# Patient Record
Sex: MALE | Race: BLACK OR AFRICAN AMERICAN | NOT HISPANIC OR LATINO | Employment: UNEMPLOYED | URBAN - METROPOLITAN AREA
[De-identification: names, ages, dates, MRNs, and addresses within clinical notes are randomized per-mention and may not be internally consistent; named-entity substitution may affect disease eponyms.]

---

## 2017-07-26 ENCOUNTER — TELEPHONE (OUTPATIENT)
Dept: PEDIATRICS | Facility: CLINIC | Age: 22
End: 2017-07-26

## 2017-07-26 NOTE — TELEPHONE ENCOUNTER
Mother calling that patient has CP. States that recently he has been very thirsty and having urinary frequency with a medicine like odor to the urine. Wanting to know if he can wait a week to be seen.  Advised that it could be signs of a UTI or also signs of diabetes and that he should be seen today to be evaluated.  Sherie Laird RN

## 2017-08-01 ENCOUNTER — OFFICE VISIT (OUTPATIENT)
Dept: PEDIATRICS | Facility: CLINIC | Age: 22
End: 2017-08-01
Payer: MEDICAID

## 2017-08-01 VITALS
WEIGHT: 114 LBS | HEART RATE: 80 BPM | HEIGHT: 62 IN | SYSTOLIC BLOOD PRESSURE: 106 MMHG | DIASTOLIC BLOOD PRESSURE: 54 MMHG | TEMPERATURE: 97.8 F | OXYGEN SATURATION: 98 % | BODY MASS INDEX: 20.98 KG/M2

## 2017-08-01 DIAGNOSIS — E55.9 VITAMIN D DEFICIENCY: ICD-10-CM

## 2017-08-01 DIAGNOSIS — G80.9 CEREBRAL PALSY, UNSPECIFIED TYPE (H): ICD-10-CM

## 2017-08-01 DIAGNOSIS — R35.0 URINARY FREQUENCY: ICD-10-CM

## 2017-08-01 DIAGNOSIS — Z00.00 ROUTINE GENERAL MEDICAL EXAMINATION AT A HEALTH CARE FACILITY: Primary | ICD-10-CM

## 2017-08-01 DIAGNOSIS — R56.9 SEIZURES (H): ICD-10-CM

## 2017-08-01 LAB
ALBUMIN UR-MCNC: NEGATIVE MG/DL
ANION GAP SERPL CALCULATED.3IONS-SCNC: 3 MMOL/L (ref 3–14)
APPEARANCE UR: CLEAR
BILIRUB UR QL STRIP: NEGATIVE
BUN SERPL-MCNC: 10 MG/DL (ref 7–30)
CALCIUM SERPL-MCNC: 9.5 MG/DL (ref 8.5–10.1)
CHLORIDE SERPL-SCNC: 106 MMOL/L (ref 94–109)
CO2 SERPL-SCNC: 25 MMOL/L (ref 20–32)
COLOR UR AUTO: YELLOW
CREAT SERPL-MCNC: 0.7 MG/DL (ref 0.66–1.25)
GFR SERPL CREATININE-BSD FRML MDRD: >90 ML/MIN/1.7M2
GLUCOSE SERPL-MCNC: 98 MG/DL (ref 70–99)
GLUCOSE UR STRIP-MCNC: NEGATIVE MG/DL
HBA1C MFR BLD: 4.9 % (ref 4.3–6)
HGB UR QL STRIP: NEGATIVE
KETONES UR STRIP-MCNC: NEGATIVE MG/DL
LEUKOCYTE ESTERASE UR QL STRIP: NEGATIVE
NITRATE UR QL: NEGATIVE
PH UR STRIP: 7 PH (ref 5–7)
POTASSIUM SERPL-SCNC: 4.5 MMOL/L (ref 3.4–5.3)
SODIUM SERPL-SCNC: 134 MMOL/L (ref 133–144)
SP GR UR STRIP: 1.01 (ref 1–1.03)
URN SPEC COLLECT METH UR: NORMAL
UROBILINOGEN UR STRIP-ACNC: 0.2 EU/DL (ref 0.2–1)

## 2017-08-01 PROCEDURE — 81003 URINALYSIS AUTO W/O SCOPE: CPT | Performed by: INTERNAL MEDICINE

## 2017-08-01 PROCEDURE — 99385 PREV VISIT NEW AGE 18-39: CPT | Performed by: INTERNAL MEDICINE

## 2017-08-01 PROCEDURE — 99212 OFFICE O/P EST SF 10 MIN: CPT | Mod: 25 | Performed by: INTERNAL MEDICINE

## 2017-08-01 PROCEDURE — 36415 COLL VENOUS BLD VENIPUNCTURE: CPT | Performed by: INTERNAL MEDICINE

## 2017-08-01 PROCEDURE — 82306 VITAMIN D 25 HYDROXY: CPT | Performed by: INTERNAL MEDICINE

## 2017-08-01 PROCEDURE — 83036 HEMOGLOBIN GLYCOSYLATED A1C: CPT | Performed by: INTERNAL MEDICINE

## 2017-08-01 PROCEDURE — 80048 BASIC METABOLIC PNL TOTAL CA: CPT | Performed by: INTERNAL MEDICINE

## 2017-08-01 NOTE — NURSING NOTE
"Chief Complaint   Patient presents with     Physical       Initial /54 (BP Location: Right arm, Cuff Size: Adult Regular)  Pulse 80  Temp 97.8  F (36.6  C) (Tympanic)  Ht 5' 2\" (1.575 m)  Wt 114 lb (51.7 kg)  SpO2 98%  BMI 20.85 kg/m2 Estimated body mass index is 20.85 kg/(m^2) as calculated from the following:    Height as of this encounter: 5' 2\" (1.575 m).    Weight as of this encounter: 114 lb (51.7 kg).  Medication Reconciliation: complete   Shira Bruce MA    "

## 2017-08-01 NOTE — PATIENT INSTRUCTIONS
1) We can try to catch a urine on the hat    2) Labs downstairs today    3) Speech, occupational therapy, and physical therapy all at [place], give them a call in [place] in a couple days to arrange- we will get these faxed over    Michael Worthy MD    Preventive Health Recommendations  Male Ages 18 - 25     Yearly exam:             See your health care provider every year in order to  o   Review health changes.   o   Discuss preventive care.    o   Review your medicines if your doctor has prescribed any.    You should be tested each year for STDs (sexually transmitted diseases).     Talk to your provider about cholesterol testing.      If you are at risk for diabetes, you should have a diabetes test (fasting glucose).    Shots: Get a flu shot each year. Get a tetanus shot every 10 years.     Nutrition:    Eat at least 5 servings of fruits and vegetables daily.     Eat whole-grain bread, whole-wheat pasta and brown rice instead of white grains and rice.     Talk to your provider about calcium and Vitamin D.     Lifestyle    Exercise for at least 150 minutes a week (30 minutes a day, 5 days a week). This will help you control your weight and prevent disease.     Limit alcohol to one drink per day.     No smoking.     Wear sunscreen to prevent skin cancer.     See your dentist every six months for an exam and cleaning.     Preventive Health Recommendations  Male Ages 18 - 25     Yearly exam:             See your health care provider every year in order to  o   Review health changes.   o   Discuss preventive care.    o   Review your medicines if your doctor has prescribed any.    You should be tested each year for STDs (sexually transmitted diseases).     Talk to your provider about cholesterol testing.      If you are at risk for diabetes, you should have a diabetes test (fasting glucose).    Shots: Get a flu shot each year. Get a tetanus shot every 10 years.     Nutrition:    Eat at least 5 servings of fruits and  vegetables daily.     Eat whole-grain bread, whole-wheat pasta and brown rice instead of white grains and rice.     Talk to your provider about calcium and Vitamin D.     Lifestyle    Exercise for at least 150 minutes a week (30 minutes a day, 5 days a week). This will help you control your weight and prevent disease.     Limit alcohol to one drink per day.     No smoking.     Wear sunscreen to prevent skin cancer.     See your dentist every six months for an exam and cleaning.

## 2017-08-01 NOTE — PROGRESS NOTES
SUBJECTIVE:   CC: Mariann Magana is an 22 year old male who presents for preventative health visit.     Physical   Annual:     Getting at least 3 servings of Calcium per day::  Yes    Bi-annual eye exam::  NO    Dental care twice a year::  NO    Sleep apnea or symptoms of sleep apnea::  None    Diet::  Regular (no restrictions)    Frequency of exercise::  2-3 days/week    Duration of exercise::  Other    Taking medications regularly::  Yes    Medication side effects::  None    Additional concerns today::  No    Has been urinating more, when not says he is thirsty. Peeing every 4-10 minutes. Past pull up onto the floor area, seems more strong in character. Mediciny quality to it. [phone] in the past. Had difficulty to stand on weight today. No fevers. Grandmother with diabetes.    CP: had vaginal delivery, did get a blood clot after that. DD. Was full 7 pounds at that time, was in the NICU- had sensitivity to light. 9 months with severe CP, was in [place] at that time. [place] around age 4 would likely not walk. Trying to keep legs from locking up. Eating ok.   - lifts weights, able to  and move with upper body strength, has issues with stretching arms out.     Tried botox in the past, concern for causing opposite of desired effect.     Seizures in the past, has been off medications without further events.        Today's PHQ-2 Score:   PHQ-2 ( 1999 Pfizer) 8/1/2017   Q1: Little interest or pleasure in doing things 1   Q2: Feeling down, depressed or hopeless 0   PHQ-2 Score 1   Q1: Little interest or pleasure in doing things Several days   Q2: Feeling down, depressed or hopeless Not at all   PHQ-2 Score 1       Abuse: Current or Past(Physical, Sexual or Emotional)- NOT APPLICABLE  Do you feel safe in your environment - NOT APPLICABLE    Social History   Substance Use Topics     Smoking status: Never Smoker     Smokeless tobacco: Never Used     Alcohol use No     The patient does not drink >3 drinks per  "day nor >7 drinks per week.    Last PSA: No results found for: PSA    Reviewed orders with patient. Reviewed health maintenance and updated orders accordingly - Yes  Labs reviewed in EPIC    Reviewed and updated as needed this visit by clinical staff  Tobacco  Allergies  Meds  Med Hx  Surg Hx  Fam Hx  Soc Hx        Reviewed and updated as needed this visit by Provider              ROS:  C: NEGATIVE for fever, chills, change in weight  I: NEGATIVE for worrisome rashes, moles or lesions  E: NEGATIVE for vision changes or irritation  ENT: NEGATIVE for ear, mouth and throat problems  R: NEGATIVE for significant cough or SOB  CV: NEGATIVE for chest pain, palpitations or peripheral edema  GI: NEGATIVE for nausea, abdominal pain, heartburn, or change in bowel habits   male: negative for dysuria, hematuria, decreased urinary stream, erectile dysfunction, urethral discharge  M: NEGATIVE for significant arthralgias or myalgia  N: NEGATIVE for weakness, dizziness or paresthesias  P: NEGATIVE for changes in mood or affect    OBJECTIVE:   /54 (BP Location: Right arm, Cuff Size: Adult Regular)  Pulse 80  Temp 97.8  F (36.6  C) (Tympanic)  Ht 5' 2\" (1.575 m)  Wt 114 lb (51.7 kg)  SpO2 98%  BMI 20.85 kg/m2    EXAM:  GENERAL: sitting in WC in no acute distress  EYES: Eyes grossly normal to inspection, PERRL and conjunctivae and sclerae normal  HENT: ear canals and TM's normal, nose and mouth without ulcers or lesions  NECK: no adenopathy, no asymmetry, masses, or scars and thyroid normal to palpation  RESP: lungs clear to auscultation - no rales, rhonchi or wheezes  CV: regular rate and rhythm, normal S1 S2, no S3 or S4, no murmur, click or rub, no peripheral edema and peripheral pulses strong  ABDOMEN: soft, nontender, no hepatosplenomegaly, no masses and bowel sounds normal  MS: noted contractures of bilateral upper extremities and slightly contractured lower extremities, feet with wide spacing and lateral " "deviation of the feet.   SKIN: no suspicious lesions or rashes  NEURO: responsive to mom, minimally communicative.   PSYCH: pleasant and appropriate    ASSESSMENT/PLAN:   1. Routine general medical examination at a health care facility  Discussed routine health screening, vaccines UTD    2. Cerebral palsy, unspecified type (H)  Will get PT evaluation as mom is concerned about contractures, discussed with Peds PT in [place], they feel appropriate for patient evaluation. [facility] in [place] did not feel equipped, Speech evaluation for assistive technology at [facility]  - Basic metabolic panel  - Hemoglobin A1c  - *UA reflex to Microscopic and Culture ([facility] (except [place] and [place])  - SPEECH THERAPY REFERRAL  - PHYSICAL THERAPY REFERRAL  - Vitamin D Deficiency  - PHYSICAL THERAPY REFERRAL  - OFFICE/OUTPT VISIT,EST,LEVL II    3. Seizures (H)  Will continue to monitor, discussed signs and symptoms to watch for   - Basic metabolic panel  - Hemoglobin A1c  - *UA reflex to Microscopic and Culture ([facility] (except [place] and [place])  - SPEECH THERAPY REFERRAL  - OFFICE/OUTPT VISIT,EST,LEVL II    4. Urinary frequency  No UTI or diabetes signs today, will continue to monitor  - Basic metabolic panel  - Hemoglobin A1c  - *UA reflex to Microscopic and Culture ([facility] (except [place] and [place])  - SPEECH THERAPY REFERRAL  - OFFICE/OUTPT VISIT,EST,LEVL II    5. Vitamin D deficiency  - vitamin D (ERGOCALCIFEROL) 22469 UNIT capsule; Take 1 capsule (50,000 Units) by mouth every 7 days for 8 doses  Dispense: 8 capsule; Refill: 0  - OFFICE/OUTPT VISIT,EST,LEVL II    COUNSELING:   Reviewed preventive health counseling, as reflected in patient instructions         reports that he has never smoked. He has never used smokeless tobacco.      Estimated body mass index is 20.85 kg/(m^2) as calculated from the following:    Height as of this encounter: 5' 2\" " (1.575 m).    Weight as of this encounter: 114 lb (51.7 kg).         Counseling Resources:  ATP IV Guidelines  Pooled Cohorts Equation Calculator  FRAX Risk Assessment  ICSI Preventive Guidelines  Dietary Guidelines for Americans, 2010  USDA's MyPlate  ASA Prophylaxis  Lung CA Screening    Michael Worthy MD, MD  [facility]        Documentation of Face-to-Face and Certification for Home Health Services     Patient: Mariann Magana   YOB: 1995  MR Number: [id number]  Today's Date: 3/27/2018    I certify that patient: Mariann Magana is under my care and that I, or a nurse practitioner or physician's assistant working with me, had a face-to-face encounter that meets the physician face-to-face encounter requirements with this patient on: 8/1/2017.    This encounter with the patient was in whole, or in part, for the following medical condition, which is the primary reason for home health care: CP, seizures.    I certify that, based on my findings, the following services are medically necessary home health services: Occupational Therapy, Physical Therapy and Speech Language Therapy.    My clinical findings support the need for the above services because: Occupational Therapy Services are needed to assess and treat cognitive ability and address ADL safety due to impairment in contractures. and Physical Therapy Services are needed to assess and treat the following functional impairments: contractures/CP.    Further, I certify that my clinical findings support that this patient is homebound (i.e. absences from home require considerable and taxing effort and are for medical reasons or [employer] services or infrequently or of short duration when for other reasons) because: Requires assistance of another person or specialized equipment to access medical services because patient: Is unable to operate assistive equipment on their own...    Based on the above findings. I certify that this patient is  confined to the home and needs intermittent skilled nursing care, physical therapy and/or speech therapy.  The patient is under my care, and I have initiated the establishment of the plan of care.  This patient will be followed by a physician who will periodically review the plan of care.  Physician/Provider to provide follow up care: Michael Worthy    Responsible Medicare certified PECOS Physician: Michael Worthy MD  Physician Signature: See electronic signature associated with these discharge orders.  Date: 3/27/2018

## 2017-08-01 NOTE — MR AVS SNAPSHOT
After Visit Summary   8/1/2017    Mariann Magana    MRN: [id number]           Patient Information     Date Of Birth          1995        Visit Information        Provider Department      8/1/2017 3:10 PM Michael Worthy MD [facility]        Today's Diagnoses     Routine general medical examination at a health care facility    -  1    Cerebral palsy, unspecified type (H)        Seizures (H)        Urinary frequency          Care Instructions    1) We can try to catch a urine on the hat    2) Labs downstairs today    3) Speech, occupational therapy, and physical therapy all at [facility], give them a call in [place] in a couple days to arrange- we will get these faxed over    Michael Worthy MD    Preventive Health Recommendations  Male Ages 18 - 25     Yearly exam:             See your health care provider every year in order to  o   Review health changes.   o   Discuss preventive care.    o   Review your medicines if your doctor has prescribed any.    You should be tested each year for STDs (sexually transmitted diseases).     Talk to your provider about cholesterol testing.      If you are at risk for diabetes, you should have a diabetes test (fasting glucose).    Shots: Get a flu shot each year. Get a tetanus shot every 10 years.     Nutrition:    Eat at least 5 servings of fruits and vegetables daily.     Eat whole-grain bread, whole-wheat pasta and brown rice instead of white grains and rice.     Talk to your provider about calcium and Vitamin D.     Lifestyle    Exercise for at least 150 minutes a week (30 minutes a day, 5 days a week). This will help you control your weight and prevent disease.     Limit alcohol to one drink per day.     No smoking.     Wear sunscreen to prevent skin cancer.     See your dentist every six months for an exam and cleaning.     Preventive Health Recommendations  Male Ages 18 - 25     Yearly exam:             See your health care provider every year  in order to  o   Review health changes.   o   Discuss preventive care.    o   Review your medicines if your doctor has prescribed any.    You should be tested each year for STDs (sexually transmitted diseases).     Talk to your provider about cholesterol testing.      If you are at risk for diabetes, you should have a diabetes test (fasting glucose).    Shots: Get a flu shot each year. Get a tetanus shot every 10 years.     Nutrition:    Eat at least 5 servings of fruits and vegetables daily.     Eat whole-grain bread, whole-wheat pasta and brown rice instead of white grains and rice.     Talk to your provider about calcium and Vitamin D.     Lifestyle    Exercise for at least 150 minutes a week (30 minutes a day, 5 days a week). This will help you control your weight and prevent disease.     Limit alcohol to one drink per day.     No smoking.     Wear sunscreen to prevent skin cancer.     See your dentist every six months for an exam and cleaning.             Follow-ups after your visit        Additional Services     OCCUPATIONAL THERAPY REFERRAL       Occupational therapy evaluation at [facility]            PHYSICAL THERAPY REFERRAL       [facility]            SPEECH THERAPY REFERRAL       AAC evaluation and treatment order for speech at [facility] Fax [phone], phone [phone]                  Who to contact     If you have questions or need follow up information about today's clinic visit or your schedule please contact [facility] directly at [phone].  Normal or non-critical lab and imaging results will be communicated to you by MyChart, letter or phone within 4 business days after the clinic has received the results. If you do not hear from us within 7 days, please contact the clinic through MyChart or phone. If you have a critical or abnormal lab result, we will notify you by phone as soon as possible.  Submit refill requests through LookFlowt or call  "your pharmacy and they will forward the refill request to us. Please allow 3 business days for your refill to be completed.          Additional Information About Your Visit        MyChart Information     GetBulbhart lets you send messages to your doctor, view your test results, renew your prescriptions, schedule appointments and more. To sign up, go to [email or web address] . Click on \"Log in\" on the left side of the screen, which will take you to the Welcome page. Then click on \"Sign up Now\" on the right side of the page.     You will be asked to enter the access code listed below, as well as some personal information. Please follow the directions to create your username and password.     Your access code is: [email or web address]  Expires: 10/30/2017  4:02 PM     Your access code will  in 90 days. If you need help or a new code, please call your [facility] clinic or [phone].        Care EveryWhere ID     This is your [facility] EveryWhere ID. This could be used by other organizations to access your [facility] medical records  [id number]        Your Vitals Were     Pulse Temperature Height Pulse Oximetry BMI (Body Mass Index)       80 97.8  F (36.6  C) (Tympanic) 5' 2\" (1.575 m) 98% 20.85 kg/m2        Blood Pressure from Last 3 Encounters:   17 106/54    Weight from Last 3 Encounters:   17 114 lb (51.7 kg)              We Performed the Following     *UA reflex to Microscopic and Culture ([facility] (except [place] and [place])     Basic metabolic panel     Hemoglobin A1c     OCCUPATIONAL THERAPY REFERRAL     PHYSICAL THERAPY REFERRAL     SPEECH THERAPY REFERRAL     Vitamin D Deficiency        Primary Care Provider    None Specified       No primary provider on file.        Equal Access to Services     TYRON VILLEGAS : anil Arora, alan stevenson. So [facility] [phone].    ATENCIÓN: Si zamzam tenorio " cochran disposición servicios gratuitos de asistencia lingüística. Salma reinoso [phone].    We comply with applicable federal civil rights laws and [place] laws. We do not discriminate on the basis of race, color, national origin, age, disability sex, sexual orientation or gender identity.            Thank you!     Thank you for choosing [facility]  for your care. Our goal is always to provide you with excellent care. Hearing back from our patients is one way we can continue to improve our services. Please take a few minutes to complete the written survey that you may receive in the mail after your visit with us. Thank you!             Your Updated Medication List - Protect others around you: Learn how to safely use, store and throw away your medicines at www.disposemymeds.org.      Notice  As of 8/1/2017  4:02 PM    You have not been prescribed any medications.

## 2017-08-02 LAB — DEPRECATED CALCIDIOL+CALCIFEROL SERPL-MC: 18 UG/L (ref 20–75)

## 2017-08-02 RX ORDER — ERGOCALCIFEROL 1.25 MG/1
50000 CAPSULE, LIQUID FILLED ORAL
Qty: 8 CAPSULE | Refills: 0 | Status: SHIPPED | OUTPATIENT
Start: 2017-08-02 | End: 2017-09-21

## 2017-08-11 ENCOUNTER — HOSPITAL ENCOUNTER (OUTPATIENT)
Dept: PHYSICAL THERAPY | Facility: CLINIC | Age: 22
End: 2017-08-11
Attending: INTERNAL MEDICINE
Payer: MEDICAID

## 2017-08-11 DIAGNOSIS — G80.9 CEREBRAL PALSY, UNSPECIFIED TYPE (H): ICD-10-CM

## 2017-08-11 DIAGNOSIS — M24.562 CONTRACTURES INVOLVING BOTH KNEES: ICD-10-CM

## 2017-08-11 DIAGNOSIS — M62.81 GENERALIZED MUSCLE WEAKNESS: Primary | ICD-10-CM

## 2017-08-11 DIAGNOSIS — M24.561 CONTRACTURES INVOLVING BOTH KNEES: ICD-10-CM

## 2017-08-11 PROCEDURE — 40000188 ZZHC STATISTIC PT OP PEDS VISIT: Mod: GP | Performed by: PHYSICAL THERAPIST

## 2017-08-11 PROCEDURE — 97161 PT EVAL LOW COMPLEX 20 MIN: CPT | Mod: GP | Performed by: PHYSICAL THERAPIST

## 2017-08-11 NOTE — PROGRESS NOTES
" 17 1000   Visit Type   Visit Type Initial   General Information   Start of Care Date 17   Referring Physician Michael Worthy MD   Orders Evaluate and Treat as Indicated   Order Date 17   Medical Diagnosis Quadruplegic CP   Onset of illness/injury or Date of Surgery (gradual decline in function)   Precautions/Limitations (falls)   Pertinent history of current problem (include personal factors and/or comorbidities that impact the POC) Pt is 22y.o. male with quad CP. Per mom, he was seen at [facility] in January, where he was started on Baclofen 3x/day. Mom felt it was making him \"floppy,\" and therefore she selected to decrease/discontinue the dosage. She has not followed up with PM&R. Last botox injections were in , at which time mom noted a R foot drop that has not resolved. Mom is a PCA for another client 4hrs/day, but is otherwise home. Pt recieves 35hrs/wk of PCA services himself. He is with dad on the weekend. Additionally pt has an 18y.o. sister that lives with them, but she will be going off to college this fall.   Birth/Adoptive history Per mom, throughout her pregnancy she was told that she would require  delivery. But at time of birth, care team chose to move forward with a vaginal delivery with significant pushing. Per report, pt suffered a blood clot during the birthing process and subsequently spent 3wks in NICU.   Surgical/Medical history reviewed Yes   Current Community Support Family/friend caregiver;Personal care attendant   Lives With parent(s)   Number of Stairs Within Home (full flight with central landing)   Home/Community Accessibility Comments Two-level [facility], which is owned. Pt's bedroom is on the upper level. Kitchen and living space is on lower level.   Current Assistive Devices Manual wheelchair;Standing Frame  (folding travel w/c; solid-frame w/c with custom seating )   Patient/family goals (learn exercises to stay strong & prevent loss of " function)   General Information Comments Pt accompanied to initial evaluation by his mother, who is primary caretaker. Per report, mom has pt exercise to keep active as much as she knows how. Per report, pt lies on his stomach over exercise ball, climbs stairs, and lifts (no pattern) free weights 10-20lbs. He is independent with walking on his knees. He can get in/out of his w/c. He has a low-profile bench bench (ie. otter), which mom lowers his down to use. He sits on the toilet for BM, but otherwise diapers. He has standing frame, which mom reports using only 1-2x/mo 2/2 pt dislikes. Per report, pt previously used KAFO's, but currently he is not wearing any orthotics.   Self- Care   Dominant Hand left   Usual Activity Tolerance good   Current Activity Tolerance good   Regular Exercise yes  (see general information comments for details)   (R) Functional Level Prior   Age appropriate No   Ambulation 2-->assistive person   Transferring 2-->assistive person   Toileting 3-->assistive equipment and person   Bathing 4-->completely dependent   Dressing 4-->completely dependent   Cognition 2 - difficulty with organizing thoughts   Which of the above functional risks had a recent onset or change? none  (gradually requiring more assist)   Cognitive Status Examination   Follows Commands and Answers Questions unable to follow commands  (some reciprocal conversation)   Personal Safety and Judgment impaired   Memory impaired   Cognitive Comment developmental delay   Behavior   Behavior Comments pleasant and cooperative; giggly   Posture    Posture deficits were identified   Posture: Deficits Identified sacral sitting;poor head alignment;poor trunk alignment   Posture Comments Seated in w/c pt displays severe sacral sitting. Lying supine he displays a mild-moderate windswept position toward the L. Forward head position and rounding throughout spine.   Range of Motion (ROM)   Range of Motion  Range of Motion is limited   Upper  Extremity Range of Motion  Bilateral: ~80deg elbow flexion contractures. UE postured into pronation with ability to get no more than neutral supination. Shoulder abduction/flexion is functional, overhead.   Lower Extremity Range of Motion  Knee flexion contractures: 34deg R, 24deg L. Hip abduction: 7deg R, 12deg L. Plantarflexion contractures: 18deg R, 3deg L. *significant pes planus if not well stabilized at calcaneous   Strength   Manual Muscle Testing Results Strength deficits identified   Lower Extremity Strength  unable to sustain full weightbearing without falling deeper into crouch   Muscle Tone Assessment   Muscle Tone Comments UE/LE spasticity   Neurological Function   Neurological Function Comments heightened 2/2 CNS lesion related to medical diagnosis   Transfer Skills and Mobility   Bed Mobility Comments Pt moves sit to stand with 2 HHAssist, pulling up into stand. He requires assist with LE management to move from sit to supine, but he can return to sit independently but with increased effort. Per report, pt is able to get in/out of his manual w/c independently.   Gait   Gait Comments Pt ambulates with max arm-in-arm assist, typically mom's right arm with pt's left arm. He stands in a significant crouch position & displays poor independent balance. He does advance LE's independently, but better with anterior advancement vs. moving toward side or backwards in order to reposition. Per report, pt's primary means of mobility is walking on his knees. This is not observed at time of evaluation 2/2 time constraints.   Balance   Balance Comments Able to independently sit unsupported at edge of mat. Unable to stand independently, and poor dynamic balance with assisted gait.   General Therapy Interventions   Planned Therapy Interventions Therapeutic Procedures;Therapeutic Activities;Neuromuscular Re-education;Gait Training   Intervention Comments stretching; standing function; parent education & home programming;  referral for orthotics; address any equipment needs   Clinical Impression   Criteria for Skilled Therapeutic Interventions Met yes;treatment indicated   PT Diagnosis muscle weakness; contractures at knees   Influenced by the following impairments upper/lower extremity spasticity; multiple joint contractures & ROM deficits; decreased functional strength; balance deficits   Functional limitations due to impairments decreased standing function needed for transfers & household mobility; decreased knowledge of appropriate activities to improve &/or preserve function   Clinical Presentation Stable/Uncomplicated   Clinical Decision Making (Complexity) Low complexity   Therapy Frequency 1 time/week   Predicted Duration of Therapy Intervention (days/wks) initiate weekly x8wks then reduce to monthly to manage/update HEP as needed   Risk & Benefits of therapy have been explained Yes   Patient, Family & other staff in agreement with plan of care Yes   Clinical Impression Comments Pt clinically stable but has not had adequate physical therapy services in recent years, and therefore primary caretaker with decreased knowledge of appropriate activities/exercises to improve or preserve function. Pt's standing function has therefore gradual declined. Pt will require skilled PT services in order to provide instruction and education regarding an appropriate HEP, including stretching, strengthening, and a standing program. He will likely benefit from custom AFO's to help improve standing function. And physical therapy can help to facilitate any other equipment needs. Anticipate a burst of therapy to establish a home routine then regular follow up for updates and to address equipment needs.   Education Assessment   Preferred Learning Style Listening;Demonstration;Pictures/video   Barriers to Learning (mutliple caregivers (mom, dad, PCA))   Pediatric Goals   PT Pediatric Goals 1;2;3;4   Goal 1   Goal Identifier LE alignment   Goal  Description Pt will wear supportive orthotics throughout full school-day in order to better control LE alignment with transfers.   Target Date 11/08/17   Goal 2   Goal Identifier Standing program   Goal Description Pt will tolerate 30min/day of standing in standing frame to improve spasticity, ROM, and overall standing function.   Target Date 12/08/17   Goal 3   Goal Identifier Upright mobility   Goal Description Pt will ambulate 50ft with min-mod arm-in-arm assistance to reduce burden of care with household mobility.   Target Date 01/08/17   Goal 4   Goal Identifier HEP   Goal Description Pt and caregiver will be independent in a medically appropriate HEP, including stretching & strengthening, to maximize clinical gains.   Target Date (ongoing)   Total Evaluation Time   Total Evaluation Time 45     Thank you for referring Mariann to outpatient physical therapy at [facility]. If you have any questions or concerns regarding this report, please feel free to contact me at [phone] or [email or web address].    Susanna Kline, PT  Peds Physical Therapist

## 2017-08-11 NOTE — PROGRESS NOTES
[facility]      OUTPATIENT PEDIATRIC PHYSICAL THERAPY EVALUATION  PLAN OF TREATMENT FOR OUTPATIENT REHABILITATION  (COMPLETE FOR INITIAL CLAIMS ONLY)  Patient's Last Name, First Name, M.I.  YOB: 1995  Mariann Magana        Provider's Name   [facility]   Medical Record No.  [id number]     Start of Care Date:  08/11/17   Onset Date:   (gradual decline in function)   Type:     _X__PT   ____OT  ____SLP Medical Diagnosis:   Quadriplegic CP     PT Diagnosis:  muscle weakness; contractures at knees Visits from SOC:  1                              __________________________________________________________________________________  Plan of Treatment/Functional Goals:  Therapeutic Procedures, Therapeutic Activities, Neuromuscular Re-education, Gait Training  stretching; standing function; parent education & home programming; referral for orthotics; address any equipment needs           1. Goal Identifier: LE alignment  Goal Description: Pt will wear supportive orthotics throughout full school-day in order to better control LE alignment with transfers.  Target Date: 11/08/17    2. Goal Identifier: Standing program  Goal Description: Pt will tolerate 30min/day of standing in standing frame to improve spasticity, ROM, and overall standing function.  Target Date: 12/08/17    3. Goal Identifier: Upright mobility  Goal Description: Pt will ambulate 50ft with min-mod arm-in-arm assistance to reduce burden of care with household mobility.  Target Date: 01/08/17    4. Goal Identifier: HEP  Goal Description: Pt and caregiver will be independent in a medically appropriate HEP, including stretching & strengthening, to maximize clinical gains.  Target Date:  (ongoing)       Therapy Frequency:  1 time/week   Predicted Duration of Therapy Intervention:  initiate weekly x8wks then reduce to monthly  to manage/update HEP as needed    Susanna Kline, PT                                    I CERTIFY THE NEED FOR THESE SERVICES FURNISHED UNDER        THIS PLAN OF TREATMENT AND WHILE UNDER MY CARE     (Physician co-signature of this document indicates review and certification of the therapy plan).                  Certification Date From:    8/11/2017  Certification Date To:   11/8/2017  Referring Provider:  Michael Worthy MD    Initial Assessment  See Epic Evaluation- 08/11/17

## 2017-11-10 NOTE — ADDENDUM NOTE
Encounter addended by: Susanna Kline, PT on: 11/10/2017  2:11 PM<BR>     Actions taken: Pend clinical note, Sign clinical note, Episode resolved

## 2018-01-21 ENCOUNTER — HEALTH MAINTENANCE LETTER (OUTPATIENT)
Age: 23
End: 2018-01-21

## 2018-08-20 ENCOUNTER — TELEPHONE (OUTPATIENT)
Dept: PEDIATRICS | Facility: CLINIC | Age: 23
End: 2018-08-20

## 2018-08-20 NOTE — TELEPHONE ENCOUNTER
Reason for Call:  Other speech generating device    Detailed comments: [employer] is calling and they are the DME provider for the pt. They have new Medicare quidelines and they have been informed that the pt must now a 6 month visit for the new Speech Generating device. They need to make the appt. The company has called them to make an appt and the family has not called to make one. They would like help in getting the appt for the pt.  [phone] direct line to Gertrudis with the DME provider.    Phone Number Patient can be reached at: Home number on file [phone] (home)    Best Time: any    Can we leave a detailed message on this number? YES    Call taken on 8/20/2018 at 10:42 AM by Sapna Briones

## 2018-08-20 NOTE — TELEPHONE ENCOUNTER
Called Gertrudis-no answer.  Left a message for her to call the clinic.  Will await her return phone call.    Angella Felix RN

## 2018-08-22 NOTE — TELEPHONE ENCOUNTER
LOV with  was on 8/1/17. Not sure whether is going to a different clinic for care. Called pt at [phone]. Mom states that pt is non-verbal, he is not getting any speech generating device from anyone. No one has contacted them regarding his PT in the last one year.    Helped to schedule an appointment for routine px on 9/12.    Andres, RN  Triage Nurse

## 2018-09-12 ENCOUNTER — OFFICE VISIT (OUTPATIENT)
Dept: PEDIATRICS | Facility: CLINIC | Age: 23
End: 2018-09-12
Payer: MEDICAID

## 2018-09-12 VITALS
TEMPERATURE: 99 F | DIASTOLIC BLOOD PRESSURE: 62 MMHG | WEIGHT: 109 LBS | BODY MASS INDEX: 19.94 KG/M2 | SYSTOLIC BLOOD PRESSURE: 114 MMHG | OXYGEN SATURATION: 97 % | HEART RATE: 79 BPM

## 2018-09-12 DIAGNOSIS — Z00.00 ROUTINE GENERAL MEDICAL EXAMINATION AT A HEALTH CARE FACILITY: Primary | ICD-10-CM

## 2018-09-12 DIAGNOSIS — E55.9 VITAMIN D DEFICIENCY: ICD-10-CM

## 2018-09-12 DIAGNOSIS — R56.9 SEIZURES (H): ICD-10-CM

## 2018-09-12 DIAGNOSIS — G80.9 CEREBRAL PALSY, UNSPECIFIED TYPE (H): ICD-10-CM

## 2018-09-12 DIAGNOSIS — F80.9 SPEECH DELAY: ICD-10-CM

## 2018-09-12 DIAGNOSIS — Z23 NEED FOR PROPHYLACTIC VACCINATION AND INOCULATION AGAINST INFLUENZA: ICD-10-CM

## 2018-09-12 DIAGNOSIS — Z99.3 WHEELCHAIR BOUND: ICD-10-CM

## 2018-09-12 PROCEDURE — 99395 PREV VISIT EST AGE 18-39: CPT | Mod: 25 | Performed by: INTERNAL MEDICINE

## 2018-09-12 PROCEDURE — 90686 IIV4 VACC NO PRSV 0.5 ML IM: CPT | Performed by: INTERNAL MEDICINE

## 2018-09-12 PROCEDURE — 90471 IMMUNIZATION ADMIN: CPT | Performed by: INTERNAL MEDICINE

## 2018-09-12 ASSESSMENT — ENCOUNTER SYMPTOMS
SHORTNESS OF BREATH: 0
PALPITATIONS: 0
CHILLS: 0
ABDOMINAL PAIN: 0
CONSTIPATION: 0
ARTHRALGIAS: 0
DIARRHEA: 0
HEADACHES: 0
HEMATOCHEZIA: 0
MYALGIAS: 0
NAUSEA: 0
PARESTHESIAS: 0
EYE PAIN: 0
HEARTBURN: 0
DYSURIA: 0
NERVOUS/ANXIOUS: 0
FREQUENCY: 0
JOINT SWELLING: 0
HEMATURIA: 0
DIZZINESS: 0
WEAKNESS: 0
SORE THROAT: 0
COUGH: 0
FEVER: 0

## 2018-09-12 NOTE — PATIENT INSTRUCTIONS
1) Vitamin D lab drawn when able    2) Flu shot today    3) Call to set up with physical medicine and rehabilitation at [facility]  Preventive Health Recommendations  Male Ages 21 - 25     Yearly exam:             See your health care provider every year in order to  o   Review health changes.   o   Discuss preventive care.    o   Review your medicines if your doctor has prescribed any.    You should be tested each year for STDs (sexually transmitted diseases).     Talk to your provider about cholesterol testing.      If you are at risk for diabetes, you should have a diabetes test (fasting glucose).    Shots: Get a flu shot each year. Get a tetanus shot every 10 years.     Nutrition:    Eat at least 5 servings of fruits and vegetables daily.     Eat whole-grain bread, whole-wheat pasta and brown rice instead of white grains and rice.     Get adequate calcium and Vitamin D.     Lifestyle    Exercise for at least 150 minutes a week (30 minutes a day, 5 days a week). This will help you control your weight and prevent disease.     Limit alcohol to one drink per day.     No smoking.     Wear sunscreen to prevent skin cancer.     See your dentist every six months for an exam and cleaning.

## 2018-09-12 NOTE — MR AVS SNAPSHOT
After Visit Summary   9/12/2018    Mariann Magana    MRN: [id number]           Patient Information     Date Of Birth          1995        Visit Information        Provider Department      9/12/2018 9:50 AM Michael Worthy MD [facility]        Today's Diagnoses     Routine general medical examination at a health care facility    -  1    Need for prophylactic vaccination and inoculation against influenza        Seizures (H)        Cerebral palsy, unspecified type (H)        Wheelchair bound          Care Instructions    1) Vitamin D lab drawn when able    2) Flu shot today    3) Call to set up with physical medicine and rehabilitation at [facility]  Preventive Health Recommendations  Male Ages 21 - 25     Yearly exam:             See your health care provider every year in order to  o   Review health changes.   o   Discuss preventive care.    o   Review your medicines if your doctor has prescribed any.    You should be tested each year for STDs (sexually transmitted diseases).     Talk to your provider about cholesterol testing.      If you are at risk for diabetes, you should have a diabetes test (fasting glucose).    Shots: Get a flu shot each year. Get a tetanus shot every 10 years.     Nutrition:    Eat at least 5 servings of fruits and vegetables daily.     Eat whole-grain bread, whole-wheat pasta and brown rice instead of white grains and rice.     Get adequate calcium and Vitamin D.     Lifestyle    Exercise for at least 150 minutes a week (30 minutes a day, 5 days a week). This will help you control your weight and prevent disease.     Limit alcohol to one drink per day.     No smoking.     Wear sunscreen to prevent skin cancer.     See your dentist every six months for an exam and cleaning.             Follow-ups after your visit        Additional Services     PHYSIATRY REFERRAL       Your provider has referred you to: [facility]  [phone] or [phone] (toll-free)      Please note these locations do not prescribe narcotics or manage chronic pain.    Please be aware that coverage of these services is subject to the terms and limitations of your health insurance plan.  Call member services at your health plan with any benefit or coverage questions.      Please bring the following to your appointment:  >>   Any x-rays, CTs or MRIs which have been performed.  Contact the facility where they were done to arrange for  prior to your scheduled appointment.    >>   List of current medications   >>   This referral request   >>   Any documents/labs given to you for this referral                  Follow-up notes from your care team     Return in about 1 year (around 9/12/2019).      Future tests that were ordered for you today     Open Future Orders        Priority Expected Expires Ordered    Vitamin D Deficiency Routine  9/12/2019 9/12/2018            Who to contact     If you have questions or need follow up information about today's clinic visit or your schedule please contact [facility] directly at [phone].  Normal or non-critical lab and imaging results will be communicated to you by [employer], letter or phone within 4 business days after the clinic has received the results. If you do not hear from us within 7 days, please contact the clinic through [employer] or phone. If you have a critical or abnormal lab result, we will notify you by phone as soon as possible.  Submit refill requests through [employer] or call your pharmacy and they will forward the refill request to us. Please allow 3 business days for your refill to be completed.          Additional Information About Your Visit        [employer] Information     [employer] gives you secure access to your electronic health record. If you see a primary care provider, you can also send messages to your care team and make appointments. If you have questions, please call your  primary care clinic.  If you do not have a primary care provider, please call [phone] and they will assist you.        Care EveryWhere ID     This is your Care EveryWhere ID. This could be used by other organizations to access your [facility] medical records  [id number]        Your Vitals Were     Pulse Temperature Pulse Oximetry BMI (Body Mass Index)          79 99  F (37.2  C) (Oral) 97% 19.94 kg/m2         Blood Pressure from Last 3 Encounters:   09/12/18 114/62   08/01/17 106/54    Weight from Last 3 Encounters:   09/12/18 109 lb (49.4 kg)   08/01/17 114 lb (51.7 kg)              We Performed the Following     FLU VACCINE, SPLIT VIRUS, IM (QUADRIVALENT) [80351]- >3 YRS     PHYSIATRY REFERRAL        Primary Care Provider Office Phone # Fax #    Michael Worthy [phone] [address] [place] [address]        Equal Access to Services     [facility]: Hadii aad ku hadasho Soomaali, waaxda luqadaha, qaybta kaalmada adeegyada, waxay idiin hayaan naveedeg rashmi fox . So [facility] [phone].    ATENCIÓN: Si habla español, tiene a cochran disposición servicios gratuitos de asistencia lingüística. Llame al [phone].    We comply with applicable federal civil rights laws and [place] laws. We do not discriminate on the basis of race, color, national origin, age, disability, sex, sexual orientation, or gender identity.            Thank you!     Thank you for choosing [facility]  for your care. Our goal is always to provide you with excellent care. Hearing back from our patients is one way we can continue to improve our services. Please take a few minutes to complete the written survey that you may receive in the mail after your visit with us. Thank you!             Your Updated Medication List - Protect others around you: Learn how to safely use, store and throw away your medicines at www.disposemymeds.org.      Notice  As of 9/12/2018 10:40 AM    You have not  been prescribed any medications.

## 2018-09-12 NOTE — PROGRESS NOTES
SUBJECTIVE:   CC: Mariann Magana is an 23 year old male who presents for preventative health visit.     Physical   Annual:     Getting at least 3 servings of Calcium per day:  Yes    Bi-annual eye exam:  NO    Dental care twice a year:  NO    Sleep apnea or symptoms of sleep apnea:  None    Diet:  Regular (no restrictions)    Frequency of exercise:  2-3 days/week    Duration of exercise:  15-30 minutes    Taking medications regularly:  Yes    Medication side effects:  None    Additional concerns today:  No    CP: Did baclofen in the past with drowsiness and did three times day in the past and then twice per day then stopped due to concerns with drowsiness. Did try botox in the past and felt that did not help. Has been doing speech therapy. Pending getting speech assistance device.    Seizures in the remote past, has been off medications without other events.    Today's PHQ-2 Score:   PHQ-2 ( 1999 Pfizer) 8/1/2017   Q1: Little interest or pleasure in doing things 1   Q2: Feeling down, depressed or hopeless 0   PHQ-2 Score 1   Q1: Little interest or pleasure in doing things Several days   Q2: Feeling down, depressed or hopeless Not at all   PHQ-2 Score 1       Abuse: Current or Past(Physical, Sexual or Emotional)- No  Do you feel safe in your environment - Yes    Social History   Substance Use Topics     Smoking status: Never Smoker     Smokeless tobacco: Never Used     Alcohol use No     Alcohol Use 9/12/2018   If you drink alcohol do you typically have greater than 3 drinks per day OR greater than 7 drinks per week? No       Last PSA: No results found for: PSA    Reviewed orders with patient. Reviewed health maintenance and updated orders accordingly - Yes  Labs reviewed in EPIC    Reviewed and updated as needed this visit by clinical staff         Reviewed and updated as needed this visit by Provider            Review of Systems   Constitutional: Negative for chills and fever.   HENT: Negative for congestion, ear  pain, hearing loss and sore throat.    Eyes: Negative for pain and visual disturbance.   Respiratory: Negative for cough and shortness of breath.    Cardiovascular: Negative for chest pain and palpitations.   Gastrointestinal: Negative for abdominal pain, constipation, diarrhea, heartburn, hematochezia and nausea.   Genitourinary: Negative for discharge, dysuria, frequency, genital sores, hematuria, impotence and urgency.   Musculoskeletal: Negative for arthralgias, joint swelling and myalgias.   Skin: Negative for rash.   Neurological: Negative for dizziness, weakness, headaches and paresthesias.   Psychiatric/Behavioral: Negative for mood changes. The patient is not nervous/anxious.          OBJECTIVE:   /62 (BP Location: Right arm, Cuff Size: Adult Regular)  Pulse 79  Temp 99  F (37.2  C) (Oral)  Wt 109 lb (49.4 kg)  SpO2 97%  BMI 19.94 kg/m2    Physical Exam  GENERAL: sitting in WC in no acute distress  EYES: Eyes grossly normal to inspection, PERRL and conjunctivae and sclerae normal  HENT: ear canals and TM's normal, nose and mouth without ulcers or lesions  NECK: no adenopathy, no asymmetry, masses, or scars and thyroid normal to palpation  RESP: lungs clear to auscultation - no rales, rhonchi or wheezes  CV: regular rate and rhythm, normal S1 S2, no S3 or S4, no murmur, click or rub, no peripheral edema and peripheral pulses strong  ABDOMEN: soft, nontender, no hepatosplenomegaly, no masses and bowel sounds normal  MS: noted contractures of bilateral upper extremities and slightly contractured lower extremities, feet with wide spacing and lateral deviation of the feet.   SKIN: no suspicious lesions or rashes  NEURO: responsive to mom, minimally communicative.   PSYCH: pleasant and appropriate    Diagnostic Test Results:  none     ASSESSMENT/PLAN:       ICD-10-CM    1. Routine general medical examination at a health care facility Z00.00 Vitamin D Deficiency   2. Need for prophylactic vaccination  "and inoculation against influenza Z23 FLU VACCINE, SPLIT VIRUS, IM (QUADRIVALENT) [44821]- >3 YRS   3. Seizures (H) R56.9 PHYSIATRY REFERRAL   4. Cerebral palsy, unspecified type (H) G80.9 PHYSIATRY REFERRAL     order for DME   5. Wheelchair bound Z99.3    6. Vitamin D deficiency E55.9 cholecalciferol (VITAMIN D/ D-VI-SOL) 400 UNIT/ML LIQD liquid   7. Speech delay F80.9 order for DME       COUNSELING:   Reviewed preventive health counseling, as reflected in patient instructions    BP Readings from Last 1 Encounters:   08/01/17 106/54     Estimated body mass index is 20.85 kg/(m^2) as calculated from the following:    Height as of 8/1/17: 5' 2\" (1.575 m).    Weight as of 8/1/17: 114 lb (51.7 kg).           reports that he has never smoked. He has never used smokeless tobacco.      Counseling Resources:  ATP IV Guidelines  Pooled Cohorts Equation Calculator  FRAX Risk Assessment  ICSI Preventive Guidelines  Dietary Guidelines for Americans, 2010  USDA's MyPlate  ASA Prophylaxis  Lung CA Screening    Michael Worthy MD, MD  [facility]    Injectable Influenza Immunization Documentation    1.  Is the person to be vaccinated sick today?   No    2. Does the person to be vaccinated have an allergy to a component   of the vaccine?   No  Egg Allergy Algorithm Link    3. Has the person to be vaccinated ever had a serious reaction   to influenza vaccine in the past?   No    4. Has the person to be vaccinated ever had Guillain-Barré syndrome?   No    Form completed by Luli Menon MA           "

## 2018-09-13 NOTE — TELEPHONE ENCOUNTER
Attempted to call Gertrudis. No answer and did not leave message as VM may not be secure. Please try again.  Nena George LPN

## 2019-07-25 ENCOUNTER — OFFICE VISIT (OUTPATIENT)
Dept: PEDIATRICS | Facility: CLINIC | Age: 24
End: 2019-07-25
Payer: MEDICAID

## 2019-07-25 VITALS
HEART RATE: 94 BPM | SYSTOLIC BLOOD PRESSURE: 128 MMHG | DIASTOLIC BLOOD PRESSURE: 82 MMHG | TEMPERATURE: 98.5 F | OXYGEN SATURATION: 97 %

## 2019-07-25 DIAGNOSIS — L81.9 HYPERPIGMENTATION: Primary | ICD-10-CM

## 2019-07-25 DIAGNOSIS — Z00.00 HEALTH CARE MAINTENANCE: ICD-10-CM

## 2019-07-25 PROCEDURE — 90715 TDAP VACCINE 7 YRS/> IM: CPT | Performed by: NURSE PRACTITIONER

## 2019-07-25 PROCEDURE — 90471 IMMUNIZATION ADMIN: CPT | Performed by: NURSE PRACTITIONER

## 2019-07-25 PROCEDURE — 99213 OFFICE O/P EST LOW 20 MIN: CPT | Mod: 25 | Performed by: NURSE PRACTITIONER

## 2019-07-25 RX ORDER — KETOCONAZOLE 20 MG/ML
SHAMPOO TOPICAL
Qty: 120 ML | Refills: 0 | Status: SHIPPED | OUTPATIENT
Start: 2019-07-25 | End: 2019-09-03

## 2019-07-25 NOTE — PROGRESS NOTES
Subjective     Mariann Magana is a 24 year old male who presents to clinic today for the following health issues:    HPI   Rash  Dark circles appearing all over body      Duration: Appeared two months ago.     Description  Location: Legs, Chest, arms   Itching: Unsure    Intensity:      Accompanying signs and symptoms: None    History (similar episodes/previous evaluation): None    Precipitating or alleviating factors:  New exposures:  None and lotions  Recent travel: no    Therapies tried and outcome: none'    Not necessarily itchy but mom states he wouldn't scratch even if it were itchy. Doesn't scratch mosquito bites. No fever, etc.     ROS: const/derm otherwise negative     OBJECTIVE:  /82 (BP Location: Right arm, Patient Position: Chair, Cuff Size: Adult Regular)   Pulse 94   Temp 98.5  F (36.9  C) (Oral)   SpO2 97%   CONSTITUTIONAL: Alert, well-nourished, well-groomed, NAD  RESP: Lungs CTA. No wheeze, rhonchi, rales.  CV: HRRR S1 S2 No MRG. No peripheral edema  DERM: Several hyperpigmented macules over legs, chest and back. Not raised.     ASSESSMENT/PLAN:  (L81.9) Hyperpigmentation  (primary encounter diagnosis)  Comment: Possible tinea versicolor.   Plan: ketoconazole (NIZORAL) 2 % external shampoo,         DERMATOLOGY REFERRAL      -Advised that the hyperpigmentation may take months to go away  -Schedule f/u with derm to verify diagnosis. Mom wants general skin check as well    (Z00.00) Health care maintenance  Plan: TDAP VACCINE (ADACEL),      ADMIN VACCINE,         FIRST                Gregoria Cecilio, NANETTE-AGUSTO.

## 2019-07-25 NOTE — PATIENT INSTRUCTIONS
Patient Education     Tinea Versicolor  This is a rash caused by a fungus in the top layers of the skin. This fungus is normally present in the pores of the skin and causes no symptoms. But when the fungus overgrows it causes a rash. The fungus grows more easily in hot climates, and on oily or sweaty skin. Health experts don t know why some people get this rash and others don t. Experts also don t know why the rash will suddenly appear in someone who has never had it before.  The rash is made up of irregular pale or tan spots and patches. The rash is usually on the neck, upper back, chest, and shoulders. You may have mild itching, especially if you become overheated. But it doesn't cause other symptoms. Because these spots don't change color with sun exposure like normal skin, the rash may be lighter or darker than your normal skin.  This rash is harmless and usually causes no symptoms. The only reason for treatment is to improve appearance. Follow the advice below to clear the rash. It might take several months for normal skin color to return.  Home care    Use a medicated dandruff shampoo over your whole body while in the shower. Don t use soap. Let the shampoo stay on for at least a few minutes before rinsing off. Do this every day for 4 weeks.    As a different treatment, you may buy an antifungal cream (miconazole or clotrimazole, both available without a prescription). Use this 2 times a day for 7 days.     This rash is not contagious to others. It can t be spread if someone touches it. So you don t have to worry about exposing others at school, , or work.  Prevention  This fungus can come back again (recur) after treatment. To prevent return of the rash, use medicated dandruff shampoo over your whole body when in the shower. Do this once a month for the next year. This is very important to do in the summertime. That is when the rash is most likely to recur.  Other prevention tips include:    Avoid  oily skin products    Wear loose clothing. Try to let your skin stay cool and breathe.    Use sunscreen and protect yourself from sunlight    Avoid tanning beds  Follow-up care  Follow up with your healthcare provider, or as advised. Call your provider if the rash doesn t get better with the above treatment, or if new symptoms appear.  When to seek medical advice  Call your healthcare provider right away if any of these occur:    Increasing redness of the rash    Change in appearance of the rash    Fever of 100.4 F (38 C) or higher, or as directed by your provider  Date Last Reviewed: 8/1/2016 2000-2018 [employer]. [address] [place] [address], [place] [address]. All rights reserved. This information is not intended as a substitute for professional medical care. Always follow your healthcare professional's instructions.

## 2019-09-03 ENCOUNTER — OFFICE VISIT (OUTPATIENT)
Dept: PEDIATRICS | Facility: CLINIC | Age: 24
End: 2019-09-03
Payer: MEDICAID

## 2019-09-03 VITALS
BODY MASS INDEX: 20.19 KG/M2 | SYSTOLIC BLOOD PRESSURE: 124 MMHG | HEART RATE: 122 BPM | TEMPERATURE: 98.5 F | WEIGHT: 110.4 LBS | OXYGEN SATURATION: 97 % | DIASTOLIC BLOOD PRESSURE: 66 MMHG

## 2019-09-03 DIAGNOSIS — Z00.00 ROUTINE GENERAL MEDICAL EXAMINATION AT A HEALTH CARE FACILITY: Primary | ICD-10-CM

## 2019-09-03 DIAGNOSIS — R56.9 SEIZURES (H): ICD-10-CM

## 2019-09-03 DIAGNOSIS — G80.9 CEREBRAL PALSY, UNSPECIFIED TYPE (H): ICD-10-CM

## 2019-09-03 PROCEDURE — 99395 PREV VISIT EST AGE 18-39: CPT | Performed by: NURSE PRACTITIONER

## 2019-09-03 RX ORDER — DIAZEPAM 2.5 MG/.5ML
5 GEL RECTAL PRN
Qty: 1 EACH | Refills: 0 | Status: SHIPPED | OUTPATIENT
Start: 2019-09-03 | End: 2023-03-27

## 2019-09-03 SDOH — HEALTH STABILITY: PHYSICAL HEALTH: ON AVERAGE, HOW MANY DAYS PER WEEK DO YOU ENGAGE IN MODERATE TO STRENUOUS EXERCISE (LIKE A BRISK WALK)?: 0 DAYS

## 2019-09-03 SDOH — SOCIAL STABILITY: SOCIAL NETWORK: IN A TYPICAL WEEK, HOW MANY TIMES DO YOU TALK ON THE PHONE WITH FAMILY, FRIENDS, OR NEIGHBORS?: ONCE A WEEK

## 2019-09-03 SDOH — SOCIAL STABILITY: SOCIAL NETWORK: HOW OFTEN DO YOU ATTENT MEETINGS OF THE CLUB OR ORGANIZATION YOU BELONG TO?: NEVER

## 2019-09-03 SDOH — SOCIAL STABILITY: SOCIAL NETWORK
DO YOU BELONG TO ANY CLUBS OR ORGANIZATIONS SUCH AS CHURCH GROUPS UNIONS, FRATERNAL OR ATHLETIC GROUPS, OR SCHOOL GROUPS?: NO

## 2019-09-03 SDOH — SOCIAL STABILITY: SOCIAL NETWORK: HOW OFTEN DO YOU ATTEND CHURCH OR RELIGIOUS SERVICES?: MORE THAN 4 TIMES PER YEAR

## 2019-09-03 SDOH — ECONOMIC STABILITY: INCOME INSECURITY: HOW HARD IS IT FOR YOU TO PAY FOR THE VERY BASICS LIKE FOOD, HOUSING, MEDICAL CARE, AND HEATING?: SOMEWHAT HARD

## 2019-09-03 SDOH — SOCIAL STABILITY: SOCIAL NETWORK: HOW OFTEN DO YOU GET TOGETHER WITH FRIENDS OR RELATIVES?: ONCE A WEEK

## 2019-09-03 SDOH — ECONOMIC STABILITY: TRANSPORTATION INSECURITY
IN THE PAST 12 MONTHS, HAS LACK OF TRANSPORTATION KEPT YOU FROM MEETINGS, WORK, OR FROM GETTING THINGS NEEDED FOR DAILY LIVING?: NO

## 2019-09-03 SDOH — HEALTH STABILITY: MENTAL HEALTH: HOW MANY STANDARD DRINKS CONTAINING ALCOHOL DO YOU HAVE ON A TYPICAL DAY?: PATIENT DECLINED

## 2019-09-03 SDOH — ECONOMIC STABILITY: FOOD INSECURITY: WITHIN THE PAST 12 MONTHS, THE FOOD YOU BOUGHT JUST DIDN'T LAST AND YOU DIDN'T HAVE MONEY TO GET MORE.: NEVER TRUE

## 2019-09-03 SDOH — HEALTH STABILITY: MENTAL HEALTH: HOW OFTEN DO YOU HAVE 6 OR MORE DRINKS ON ONE OCCASION?: NEVER

## 2019-09-03 SDOH — HEALTH STABILITY: MENTAL HEALTH
STRESS IS WHEN SOMEONE FEELS TENSE, NERVOUS, ANXIOUS, OR CAN'T SLEEP AT NIGHT BECAUSE THEIR MIND IS TROUBLED. HOW STRESSED ARE YOU?: NOT AT ALL

## 2019-09-03 SDOH — HEALTH STABILITY: PHYSICAL HEALTH: ON AVERAGE, HOW MANY MINUTES DO YOU ENGAGE IN EXERCISE AT THIS LEVEL?: 0 MIN

## 2019-09-03 SDOH — HEALTH STABILITY: MENTAL HEALTH: HOW OFTEN DO YOU HAVE A DRINK CONTAINING ALCOHOL?: NEVER

## 2019-09-03 SDOH — SOCIAL STABILITY: SOCIAL NETWORK: ARE YOU MARRIED, WIDOWED, DIVORCED, SEPARATED, NEVER MARRIED, OR LIVING WITH A PARTNER?: NEVER MARRIED

## 2019-09-03 SDOH — ECONOMIC STABILITY: TRANSPORTATION INSECURITY
IN THE PAST 12 MONTHS, HAS THE LACK OF TRANSPORTATION KEPT YOU FROM MEDICAL APPOINTMENTS OR FROM GETTING MEDICATIONS?: NO

## 2019-09-03 SDOH — ECONOMIC STABILITY: FOOD INSECURITY: WITHIN THE PAST 12 MONTHS, YOU WORRIED THAT YOUR FOOD WOULD RUN OUT BEFORE YOU GOT MONEY TO BUY MORE.: NEVER TRUE

## 2019-09-03 ASSESSMENT — ENCOUNTER SYMPTOMS
NERVOUS/ANXIOUS: 0
WEAKNESS: 0
FEVER: 0
ARTHRALGIAS: 0
NAUSEA: 0
MYALGIAS: 0
SORE THROAT: 0
JOINT SWELLING: 0
CONSTIPATION: 0
HEMATURIA: 0
HEARTBURN: 0
HEADACHES: 0
SHORTNESS OF BREATH: 0
PALPITATIONS: 0
DIARRHEA: 0
FREQUENCY: 0
HEMATOCHEZIA: 0
DIZZINESS: 0
DYSURIA: 0
CHILLS: 0
EYE PAIN: 0
PARESTHESIAS: 0
COUGH: 0
ABDOMINAL PAIN: 0

## 2019-09-03 NOTE — PROGRESS NOTES
SUBJECTIVE:   CC: Mariann Magana is an 24 year old male who presents for preventative health visit.   Patient here today with mother:    Healthy Habits:     Getting at least 3 servings of Calcium per day:  Yes    Bi-annual eye exam:  NO    Dental care twice a year:  NO    Sleep apnea or symptoms of sleep apnea:  None    Diet:  Regular (no restrictions)    Frequency of exercise:  None    Taking medications regularly:  Yes    Medication side effects:  None    PHQ-2 Total Score: 0    Additional concerns today:  Yes    Concerns today: spots have gotten worse      CP: Did baclofen in the past with drowsiness and did three times day in the past and then twice per day then stopped due to concerns with drowsiness. Did try botox in the past and felt that did not help. Has been doing speech therapy. Pending getting speech assistance device.     Seizures in the remote past, has been off medications without other events.Last seizure 2010. Started age 6. Started as one type and ended in grand mal seizures.  -------------------------------------    Today's PHQ-2 Score:   PHQ-2 ( 1999 Pfizer) 9/3/2019   Q1: Little interest or pleasure in doing things 0   Q2: Feeling down, depressed or hopeless 0   PHQ-2 Score 0   Q1: Little interest or pleasure in doing things Not at all   Q2: Feeling down, depressed or hopeless Not at all   PHQ-2 Score 0     Abuse: Current or Past(Physical, Sexual or Emotional)- No  Do you feel safe in your environment? NOT APPLICABLE    Social History     Tobacco Use     Smoking status: Never Smoker     Smokeless tobacco: Never Used   Substance Use Topics     Alcohol use: No     Frequency: Never     Drinks per session: Patient refused     Binge frequency: Never       Alcohol Use 9/3/2019   Prescreen: >3 drinks/day or >7 drinks/week? Not Applicable   Prescreen: >3 drinks/day or >7 drinks/week? -     Last PSA: No results found for: PSA    Reviewed orders with patient. Reviewed health maintenance and updated  orders accordingly - Yes  Lab work is in process    Reviewed and updated as needed this visit by clinical staff  Tobacco  Allergies  Med Hx  Surg Hx  Fam Hx  Soc Hx        Reviewed and updated as needed this visit by Provider          Review of Systems   Constitutional: Negative for chills and fever.   HENT: Negative for congestion, ear pain, hearing loss and sore throat.    Eyes: Negative for pain and visual disturbance.   Respiratory: Negative for cough and shortness of breath.    Cardiovascular: Positive for peripheral edema. Negative for chest pain and palpitations.   Gastrointestinal: Negative for abdominal pain, constipation, diarrhea, heartburn, hematochezia and nausea.   Genitourinary: Negative for discharge, dysuria, frequency, genital sores, hematuria, impotence and urgency.   Musculoskeletal: Negative for arthralgias, joint swelling and myalgias.   Skin: Negative for rash.   Neurological: Negative for dizziness, weakness, headaches and paresthesias.   Psychiatric/Behavioral: Negative for mood changes. The patient is not nervous/anxious.        OBJECTIVE:   /66 (BP Location: Right arm, Cuff Size: Adult Regular)   Pulse 122   Temp 98.5  F (36.9  C) (Tympanic)   Wt 50.1 kg (110 lb 6.4 oz)   SpO2 97%   BMI 20.19 kg/m      Physical Exam  GENERAL:alert and no distress, in wheelchair  EYES: Eyes grossly normal to inspection, PERRL and conjunctivae and sclerae normal  HENT: ear canals and TM's normal, nose and mouth without ulcers or lesions  NECK: no adenopathy, no asymmetry, masses, or scars and thyroid normal to palpation  RESP: lungs clear to auscultation - no rales, rhonchi or wheezes  CV: regular rate and rhythm, normal S1 S2, no S3 or S4, no murmur, click or rub, no peripheral edema and peripheral pulses strong  ABDOMEN: soft, nontender, no hepatosplenomegaly, no masses and bowel sounds normal  MS: no gross musculoskeletal defects noted, no edema  SKIN: no suspicious lesions or  "rashes  NEURO: Normal strength and tone, mentation intact and speech normal  PSYCH: mentation appears normal, affect normal/bright    Diagnostic Test Results:  Labs reviewed in Epic    ASSESSMENT/PLAN:   (Z00.00) Routine general medical examination at a health care facility  (primary encounter diagnosis)    (G80.9) Cerebral palsy, unspecified type (H)  Comment: Not on any meds. Mostly non verbal.  -Referred to PT and neuro  -Handicapped permit written    (R56.9) Seizures (H)  Comment: From [id number]. Grand Mal. Hasn't been on seizure meds for over 5 years.   Plan:   -Referred to neuro  -Mom wanted one valium supp around in case he has a seizure, which I thought was reasonable.         COUNSELING:   Reviewed preventive health counseling, as reflected in patient instructions    Estimated body mass index is 20.19 kg/m  as calculated from the following:    Height as of 8/1/17: 1.575 m (5' 2\").    Weight as of this encounter: 50.1 kg (110 lb 6.4 oz).          reports that he has never smoked. He has never used smokeless tobacco.      Counseling Resources:  ATP IV Guidelines  Pooled Cohorts Equation Calculator  FRAX Risk Assessment  ICSI Preventive Guidelines  Dietary Guidelines for Americans, 2010  USDA's MyPlate  ASA Prophylaxis  Lung CA Screening    JUSTICE Mi [facility] UMESH  "

## 2019-09-03 NOTE — Clinical Note
Hi there, I have a patient establishing care with me who has cerebral palsy and history of seizure disorder. Would you be willing to see him, or should he be sent elsewhere? He is fairly well controlled on zero medications. Has some spasticity and may need to be connected with some resources such as PT, OT, etc. Thank you kindly!Gregoria Chacon, NANETTE-AGUSTO.

## 2019-09-03 NOTE — PATIENT INSTRUCTIONS
FHN: My Dermatologist - [place] [phone]      I'll message you with the referral information for neuro    Please see PT downstairs    Preventive Health Recommendations  Male Ages 21 - 25     Yearly exam:             See your health care provider every year in order to  o   Review health changes.   o   Discuss preventive care.    o   Review your medicines if your doctor has prescribed any.    You should be tested each year for STDs (sexually transmitted diseases).     Talk to your provider about cholesterol testing.      If you are at risk for diabetes, you should have a diabetes test (fasting glucose).    Shots: Get a flu shot each year. Get a tetanus shot every 10 years.     Nutrition:    Eat at least 5 servings of fruits and vegetables daily.     Eat whole-grain bread, whole-wheat pasta and brown rice instead of white grains and rice.     Get adequate calcium and Vitamin D.     Lifestyle    Exercise for at least 150 minutes a week (30 minutes a day, 5 days a week). This will help you control your weight and prevent disease.     Limit alcohol to one drink per day.     No smoking.     Wear sunscreen to prevent skin cancer.     See your dentist every six months for an exam and cleaning.

## 2019-09-13 ENCOUNTER — HOSPITAL ENCOUNTER (OUTPATIENT)
Dept: OCCUPATIONAL THERAPY | Facility: CLINIC | Age: 24
End: 2019-09-13
Payer: MEDICAID

## 2019-09-13 DIAGNOSIS — M25.629 DECREASED RANGE OF MOTION OF ELBOW, UNSPECIFIED LATERALITY: ICD-10-CM

## 2019-09-13 DIAGNOSIS — Z78.9 IMPAIRED INSTRUMENTAL ACTIVITIES OF DAILY LIVING (IADL): ICD-10-CM

## 2019-09-13 DIAGNOSIS — Z78.9 DECREASED ACTIVITIES OF DAILY LIVING (ADL): ICD-10-CM

## 2019-09-13 DIAGNOSIS — M62.81 GENERALIZED MUSCLE WEAKNESS: Primary | ICD-10-CM

## 2019-09-13 PROCEDURE — 97535 SELF CARE MNGMENT TRAINING: CPT | Mod: GO | Performed by: OCCUPATIONAL THERAPIST

## 2019-09-13 PROCEDURE — 97165 OT EVAL LOW COMPLEX 30 MIN: CPT | Mod: GO | Performed by: OCCUPATIONAL THERAPIST

## 2019-09-30 NOTE — PROGRESS NOTES
09/13/19 2200   Quick Adds   Type of Visit Initial Outpatient Occupational Therapy Evaluation   General Information   Start Of Care Date 09/13/19   Referring Physician Gregoria Hernandes NP   Orders Evaluate and treat as indicated   Orders Date 08/22/19   Medical Diagnosis Cerebral Palsy   Onset of Illness/Injury or Date of Surgery 08/22/19   Surgical/Medical History Reviewed Yes   Additional Occupational Profile Info/Pertinent History of Current Problem  Pt is 23 yo male who is accompanied to his appointment by his mother to address his needs for AE to maximize his independence and increase ease with cares. Pt has not been to therapy since childhood and has limited elbow flexion an extension due to contractures.  Pt dependent on w/c for ambulation.    Role/Living Environment   Current Community Support Family/friend caregiver   Patient role/Employment history Disabled   Current Living Environment House   Prior Responsibilities - IADL   (Pt was not imdependent in any of the above )   Prior Level Comments Pt has been in a wheelchair since childhood   Role/Living Environment Comments Pt requires total assist for most self cares, though he can paricipate in brushing teeth and combing hair.    Patient/family Goals Statement Pts mother would like patient to be able to participate in self cares and IADLs more.   Pain   Patient currently in pain No   Fall Risk Screen   Fall screen completed by OT   Fall screen comments Pt is dependent on wheelchair for ambulation and dependent on others to operate the wheelchair for mobility.   Abuse Screen (yes response referral indicated)   Feels Unsafe at Home or Work/School no   Feels Threatened by Someone no   Does Anyone Try to Keep You From Having Contact with Others or Doing Things Outside Your Home? no   Physical Signs of Abuse Present no   Cognitive Status Examination   Level of Consciousness Alert   Range of Motion (ROM)   ROM Comments Contracture present at elbow  flexion/extension, with limited ROM   Strength   Strength Comments decreased strength in B UE   Hand Strength   Hand Dominance Right   Bathing   Level of [place] - Bathing dependent (less than 25% patients effort)   Physical Assist/Nonphysical Assist - Bathing 1 person assist   Assistive Device detachable shower head;grab bars;shower chair   Bathing Comments Pt needs extensive assistance for bathing/showering   Upper Body Dressing   Level of [place]: Dress Upper Body dependent (less than 25% patients effort)   Physical Assist/Nonphysical Assist: Dress Upper Body 1 person assist   Lower Body Dressing   Level of Ec[place]: Dress Lower Body dependent (less than 25% patients effort)   Physical Assist/Nonphysical Assist: Dress Lower Body 1 person assist   Toileting   Level of Ec[place]: Toilet dependent (less than 25% patients effort)   Physical Assist/Nonphysical Assist: Toilet 1 person assist   Grooming   Level of Ec[place]: Grooming moderate assist (50% patients effort)   Physical Assist/Nonphysical Assist: Grooming 1 person assist   Assistive Device electric razor;electric toothbrush;large  grooming tools   Grooming Comments Pt can use toothbrush   Eating/Self-Feeding   Level of Eccles: Eating moderate assist (50% patients effort)   Physical Assist/Nonphysical Assist: Eating 1 person assist   Eating/Self Feeding Comments Pt requires assist for eating   Planned Therapy Interventions   Planned Therapy Interventions ADL training;Self care/Home management   Intervention Comments Evaluation complete. Goal acheived in same session as evaluation.  This note to serve as evaluation as well as discharge note.     OT Goal 1   Goal Identifier AE for maximizing ADL independence.   Goal Description Pt and/or caregiver-parent to verbalize an understanding of at least three pieces of adaptive equipment to maximize pts independence with ADLs/IADLs.     Target Date 09/30/19   Date Met 09/13/19   Clinical  Impression   Criteria for Skilled Therapeutic Interventions Met Yes, treatment indicated   OT Diagnosis impaired ADLs/IADLs   Influenced by the following impairments impaired coordination fmc/gmc, impaired strength, musle contracture at B elbows,  decreased speech   Assessment of Occupational Performance 1-3 Performance Deficits   Identified Performance Deficits Pt requires assist for all self cares including dressing , bathing, grooming, feeding, toileting.    Clinical Decision Making (Complexity) Low complexity   Therapy Frequency 1x   Predicted Duration of Therapy Intervention (days/wks) 1 day   Risks and Benefits of Treatment have been explained. Yes   Patient, Family & other staff in agreement with plan of care Yes   Clinical Impression Comments Pt will benefit from OT services to address the above goal.    Education Assessment   Barriers To Learning Physical   Preferred Learning Style Listening;Reading;Demonstration;Pictures/video   Therapy Certification   Certification date from 09/13/19   Certification date to 10/13/19   Certification I certify the need for these services furnished under this plan of treatment and while under my care.  (Physician co-signature of this document indicates review and certification of the therapy plan)   Total Evaluation Time   OT Eval, Low Complexity Minutes (10376) 35

## 2019-09-30 NOTE — PROGRESS NOTES
[facility]          OUTPATIENT OCCUPATIONAL THERAPY  EVALUATION  PLAN OF TREATMENT FOR OUTPATIENT REHABILITATION  (COMPLETE FOR INITIAL CLAIMS ONLY)  Patient's Last Name, First Name, M.I.  YOB: 1995  Mariann Magana                           Provider's Name  [facility] Medical Record No.  [id number]                               Onset Date:     08/22/19   Start of Care Date:     09/13/19   Type:     ___PT   _X_OT   ___SLP Medical Diagnosis:     Cerebral Palsy                          OT Diagnosis:     impaired ADLs/IADLs Visits from SOC:  1   _________________________________________________________________________________  Plan of Treatment/Functional Goals:  ADL training, Self care/Home management     Evaluation complete. Goal acheived in same session as evaluation.               Goals  Goal Identifier: AE for maximizing ADL independence.  Goal Description: Pt and/or caregiver-parent to verbalize an understanding of at least three pieces of adaptive equipment to maximize pts independence with ADLs/IADLs.    Target Date: 09/30/19  Date Met: 09/13/19                                                                                           Therapy Frequency: 1x     Predicted Duration of Therapy Intervention (days/wks): 1 day  Chelle Weiss OT          I CERTIFY THE NEED FOR THESE SERVICES FURNISHED UNDER        THIS PLAN OF TREATMENT AND WHILE UNDER MY CARE     (Physician co-signature of this document indicates review and certification of the therapy plan).                 ,    Certification date from: 09/13/19, Certification date to: 10/13/19               Referring Physician: Gregoria Hernandes NP     Initial Assessment        See Epic Evaluation      Start Of Care Date: 09/13/19

## 2020-03-11 ENCOUNTER — HEALTH MAINTENANCE LETTER (OUTPATIENT)
Age: 25
End: 2020-03-11

## 2020-07-22 ENCOUNTER — HOSPITAL ENCOUNTER (EMERGENCY)
Facility: CLINIC | Age: 25
Discharge: HOME | End: 2020-07-23
Attending: EMERGENCY MEDICINE | Admitting: EMERGENCY MEDICINE
Payer: MEDICAID

## 2020-07-22 DIAGNOSIS — Z87.898 HISTORY OF FEVER: ICD-10-CM

## 2020-07-22 PROCEDURE — 99284 EMERGENCY DEPT VISIT MOD MDM: CPT | Mod: 25

## 2020-07-22 PROCEDURE — C9803 HOPD COVID-19 SPEC COLLECT: HCPCS

## 2020-07-22 NOTE — ED AVS SNAPSHOT
[facility] Emergency Department  [address]  [facility] [address]  Phone:  [phone]  Fax:  [phone]                                    Mariann Magana   MRN: [id number]    Department:  [facility] Emergency Department   Date of Visit:  7/22/2020           After Visit Summary Signature Page    I have received my discharge instructions, and my questions have been answered. I have discussed any challenges I see with this plan with the nurse or doctor.    ..........................................................................................................................................  Patient/Patient Representative Signature      ..........................................................................................................................................  Patient Representative Print Name and Relationship to Patient    ..................................................               ................................................  Date                                   Time    ..........................................................................................................................................  Reviewed by Signature/Title    ...................................................              ..............................................  Date                                               Time          22EPIC Rev 08/18

## 2020-07-23 ENCOUNTER — APPOINTMENT (OUTPATIENT)
Dept: GENERAL RADIOLOGY | Facility: CLINIC | Age: 25
End: 2020-07-23
Attending: EMERGENCY MEDICINE
Payer: MEDICAID

## 2020-07-23 VITALS
DIASTOLIC BLOOD PRESSURE: 82 MMHG | SYSTOLIC BLOOD PRESSURE: 119 MMHG | TEMPERATURE: 98.1 F | OXYGEN SATURATION: 100 % | RESPIRATION RATE: 18 BRPM | HEART RATE: 91 BPM

## 2020-07-23 DIAGNOSIS — Z20.822 COVID-19 RULED OUT: Primary | ICD-10-CM

## 2020-07-23 LAB
ALBUMIN UR-MCNC: 20 MG/DL
AMORPH CRY #/AREA URNS HPF: ABNORMAL /HPF
ANION GAP SERPL CALCULATED.3IONS-SCNC: 4 MMOL/L (ref 3–14)
APPEARANCE UR: ABNORMAL
BACTERIA #/AREA URNS HPF: ABNORMAL /HPF
BASOPHILS # BLD AUTO: 0 10E9/L (ref 0–0.2)
BASOPHILS NFR BLD AUTO: 0.3 %
BILIRUB UR QL STRIP: NEGATIVE
BUN SERPL-MCNC: 12 MG/DL (ref 7–30)
CALCIUM SERPL-MCNC: 8.9 MG/DL (ref 8.5–10.1)
CHLORIDE SERPL-SCNC: 114 MMOL/L (ref 94–109)
CO2 SERPL-SCNC: 25 MMOL/L (ref 20–32)
COLOR UR AUTO: YELLOW
CREAT SERPL-MCNC: 0.72 MG/DL (ref 0.66–1.25)
DEPRECATED S PYO AG THROAT QL EIA: NEGATIVE
DIFFERENTIAL METHOD BLD: NORMAL
EOSINOPHIL # BLD AUTO: 0 10E9/L (ref 0–0.7)
EOSINOPHIL NFR BLD AUTO: 0.1 %
ERYTHROCYTE [DISTWIDTH] IN BLOOD BY AUTOMATED COUNT: 12.3 % (ref 10–15)
GFR SERPL CREATININE-BSD FRML MDRD: >90 ML/MIN/{1.73_M2}
GLUCOSE SERPL-MCNC: 110 MG/DL (ref 70–99)
GLUCOSE UR STRIP-MCNC: 50 MG/DL
HCT VFR BLD AUTO: 45.3 % (ref 40–53)
HGB BLD-MCNC: 14.4 G/DL (ref 13.3–17.7)
HGB UR QL STRIP: ABNORMAL
IMM GRANULOCYTES # BLD: 0 10E9/L (ref 0–0.4)
IMM GRANULOCYTES NFR BLD: 0.2 %
KETONES UR STRIP-MCNC: NEGATIVE MG/DL
LEUKOCYTE ESTERASE UR QL STRIP: NEGATIVE
LYMPHOCYTES # BLD AUTO: 1.5 10E9/L (ref 0.8–5.3)
LYMPHOCYTES NFR BLD AUTO: 16.8 %
MCH RBC QN AUTO: 29.6 PG (ref 26.5–33)
MCHC RBC AUTO-ENTMCNC: 31.8 G/DL (ref 31.5–36.5)
MCV RBC AUTO: 93 FL (ref 78–100)
MONOCYTES # BLD AUTO: 0.8 10E9/L (ref 0–1.3)
MONOCYTES NFR BLD AUTO: 9.2 %
MUCOUS THREADS #/AREA URNS LPF: PRESENT /LPF
NEUTROPHILS # BLD AUTO: 6.7 10E9/L (ref 1.6–8.3)
NEUTROPHILS NFR BLD AUTO: 73.4 %
NITRATE UR QL: NEGATIVE
NRBC # BLD AUTO: 0 10*3/UL
NRBC BLD AUTO-RTO: 0 /100
PH UR STRIP: 7 PH (ref 5–7)
PLATELET # BLD AUTO: 206 10E9/L (ref 150–450)
POTASSIUM SERPL-SCNC: 3.9 MMOL/L (ref 3.4–5.3)
RBC # BLD AUTO: 4.87 10E12/L (ref 4.4–5.9)
RBC #/AREA URNS AUTO: 11 /HPF (ref 0–2)
SARS-COV-2 RNA SPEC QL NAA+PROBE: NOT DETECTED
SODIUM SERPL-SCNC: 143 MMOL/L (ref 133–144)
SOURCE: ABNORMAL
SP GR UR STRIP: 1.03 (ref 1–1.03)
SPECIMEN SOURCE: NORMAL
SQUAMOUS #/AREA URNS AUTO: <1 /HPF (ref 0–1)
STREP GROUP A PCR: NOT DETECTED
UROBILINOGEN UR STRIP-MCNC: 3 MG/DL (ref 0–2)
WBC # BLD AUTO: 9.1 10E9/L (ref 4–11)
WBC #/AREA URNS AUTO: <1 /HPF (ref 0–5)

## 2020-07-23 PROCEDURE — 87651 STREP A DNA AMP PROBE: CPT | Performed by: EMERGENCY MEDICINE

## 2020-07-23 PROCEDURE — U0003 INFECTIOUS AGENT DETECTION BY NUCLEIC ACID (DNA OR RNA); SEVERE ACUTE RESPIRATORY SYNDROME CORONAVIRUS 2 (SARS-COV-2) (CORONAVIRUS DISEASE [COVID-19]), AMPLIFIED PROBE TECHNIQUE, MAKING USE OF HIGH THROUGHPUT TECHNOLOGIES AS DESCRIBED BY CMS-2020-01-R: HCPCS | Performed by: EMERGENCY MEDICINE

## 2020-07-23 PROCEDURE — 71045 X-RAY EXAM CHEST 1 VIEW: CPT

## 2020-07-23 PROCEDURE — 80048 BASIC METABOLIC PNL TOTAL CA: CPT | Performed by: EMERGENCY MEDICINE

## 2020-07-23 PROCEDURE — 81001 URINALYSIS AUTO W/SCOPE: CPT | Performed by: EMERGENCY MEDICINE

## 2020-07-23 PROCEDURE — 85025 COMPLETE CBC W/AUTO DIFF WBC: CPT | Performed by: EMERGENCY MEDICINE

## 2020-07-23 PROCEDURE — 40001204 ZZHCL STATISTIC STREP A RAPID: Performed by: EMERGENCY MEDICINE

## 2020-07-23 NOTE — ED TRIAGE NOTES
Here for concern for fever of 101F, bilateral ear pain, body tense and jerking which usually indicates patient is in pain. Patient with cerebral palsy and is wheel chair bound. ABCs intact.

## 2020-07-23 NOTE — ED PROVIDER NOTES
History     Chief Complaint:  Fever    HPI   Mariann Magana is a 25-year-old male w/ a past medical history significant for cerebral palsy, seizures currently off medication who presents to the emergency department with a chief complaint of fever of 101  F at home T-max onset tonight with associated bilateral ear pain and seemingly hypersensitive to touch.  History provided by the patient's father.  No seizure activity was witnessed by the patient's mother or father but they're somewhat concerned that he may have had a seizure given he seemed he may be postictal.  In the ED, the patient's father reports that he is back to baseline.  The patient's father denies any new changes from the pt's baseline stating that he has been eating and drinking normally, no changes in urination or bowel movements, no rashes, no cough, no difficulty breathing.      Allergies:  No Known Drug Allergies    Medications:    Diazepam     Past Medical History:    Cerebral palsy   Seizures    Past Surgical History:    The patient does not have any pertinent past surgical history.    Family History:    Seizure disorder - paternal grandmother     Social History:  Patient uses wheelchair   Patient sees Dr. Rodrigues for seizure management. Off medications since 2012.     Review of Systems  Full ROS completed and negative other than pertinent positives and negatives noted in HPI    Physical Exam     Patient Vitals for the past 24 hrs:   BP Temp Temp src Pulse Heart Rate Resp SpO2   07/23/20 0349 -- 98.1  F (36.7  C) Temporal -- -- -- --   07/23/20 0345 119/82 -- -- 91 -- -- 100 %   07/23/20 0330 116/84 -- -- 85 -- -- 99 %   07/23/20 0315 107/81 -- -- 83 -- -- --   07/22/20 2345 (!) 128/93 98.6  F (37  C) Oral 103 103 18 99 %     Physical Exam  General: Well nourished, nontox appearance  Head: Atraumatic. No facial swelling noted.   Eyes: sclera nonicteric.  conjunctiva noninjected. PERRLA, EOMI.  Ears:  no external auditory canal discharge or  bleeding.   TM's examined: Right obscured by cerumen, left normal with no erythema nor alteration in light reflex.  No mastoid tenderness bilaterally  Nose: No rhinorrhea.  no bleeding noted.   Mouth:  Atraumatic.  no posterior pharyngeal erythema or exudate. No oral lesions.  Neck:  supple without lymphadenopathy, full AROM, no meningismus  Cardiac: Borderline regular tachycardia  Pulmonary: Normal respiratory effort, CTA bilaterally  Abdomen: ND, NT  No hepatosplenomegaly.  No rebound or guarding.  Extremities: No rash or edema. Capillary refil < 3 sec  Skin:  No rashes noted, no petichiae or purpura.   Neurologic:  Alert and interactive.  Baseline cerebral palsy  Psych: Calm, appropriate interactions    Emergency Department Course     Imaging:  Radiology findings were communicated with the patient who voiced understanding of the findings.    XR Chest Port   Negative chest.     Reading per radiology    Laboratory:  Laboratory findings were communicated with the patient who voiced understanding of the findings.    CBC: WBC 9.1, HGB 14.4,     BMP: 114 (H), Glucose 110 (H) o/w WNL (Creatinine 0.72)    UA w/ Micro: GLC 50 (A), Blood Small (A), Albumin 20 (A), Urobilinogen mg/dL 3.0 (H), RBC/HPF 11 (H), Bacteria Few (A), Mucous present (A), Amorphous Crystals Few (A) o/w WNL     Streptococcus A - Rapid: Negative     COVID-19 by PCR: pending     Emergency Department Course:     Nursing notes and vitals reviewed.     I performed an exam of the patient as documented above.     0000 The patient's nose was swabbed and this sample was sent for COVID testing, findings above.     0030 The patient's throat was swabbed and this sample was sent for Strep testing, findings above.     0052 The patient provided a urine sample here in the emergency department. This was sent for laboratory testing, findings above.    0138 The patient was sent for a XR while in the emergency department, results above.     0240 IV was inserted  and blood was drawn for laboratory testing, results above.          Impression & Plan      Medical Decision Making:  Mariann Magana is a 25 year old male presenting w/ transient reported fever, afebrile in the emergency department.     DDx includes viral syndrome NOS, influenza-like illness, influenza, COVID-19, seizure, UTI, pneumonia.  Given reported fever, parents concern for possible seizure, a broad infectious work-up was undertaken although the patient appears well and at his baseline in the emergency department.  Labs significant for UA inconsistent with infection, normal BMP and CBC, COVID swab pending, negative strep.  Imaging sig for no acute cardiopulmonary disease.  Patient remained afebrile and stable throughout his emergency department stay and there was no noted seizure activity.  Given his unremarkable work-up, normal vital signs and uneventful ED course, at this time I feel the pt is safe for discharge.  It is possible that the patient is experiencing an acute viral infection that lowered his seizure threshold but there is no obvious evidence of this on exam or on ED work-up.  I do not think emergency neurology consultation is indicated at this time.  I think watchful waiting at home would be appropriate with strict return precautions given for reevaluation in the ED.  Recommendations given regarding follow up with PCP and return to the emergency department as needed for new or worsening symptoms.  Parents counseled on all results, disposition and diagnosis.  They are understanding and agreeable to plan. Patient discharged in stable condition.      Covid-19  Mariann Magana was evaluated during a global COVID-19 pandemic, which necessitated consideration that the patient might be at risk for infection with the SARS-CoV-2 virus that causes COVID-19.   Applicable protocols for evaluation were followed during the patient's care.   COVID-19 was considered as part of the patient's evaluation. The plan  for testing is:  a test was obtained during this visit.    Diagnosis:    ICD-10-CM    1. History of fever  Z87.898      Disposition:   Patient is discharged home.     Scribe Disclosure:  I, Cleo Teofilo, am serving as a scribe at 12:01 AM on 7/23/2020 to document services personally performed by [place] MD based on my observations and the provider's statements to me.    [facility] EMERGENCY DEPARTMENT       Twin [place] MD  07/23/20 1914       wTin Vegas MD  07/23/20 1916

## 2020-07-23 NOTE — DISCHARGE INSTRUCTIONS
Please return to the emergency department as needed for new or worsening symptoms including multiple seizures, persistent fever greater than 100.4  F, difficulty breathing, vomiting and unable to keep anything down, severe confusion, unable to awake, any other concerning symptoms.    Please follow-up with your neurologist as needed.

## 2020-07-24 ENCOUNTER — TELEPHONE (OUTPATIENT)
Dept: PEDIATRICS | Facility: CLINIC | Age: 25
End: 2020-07-24

## 2020-07-24 ENCOUNTER — PATIENT OUTREACH (OUTPATIENT)
Dept: CARE COORDINATION | Facility: CLINIC | Age: 25
End: 2020-07-24

## 2020-07-24 ASSESSMENT — ACTIVITIES OF DAILY LIVING (ADL)
DEPENDENT_IADLS:: CLEANING;COOKING;LAUNDRY;SHOPPING;MEAL PREPARATION;MEDICATION MANAGEMENT;MONEY MANAGEMENT;TRANSPORTATION;INCONTINENCE

## 2020-07-24 NOTE — PROGRESS NOTES
Clinic Care Coordination Contact  [facility]/Voicemail    Patient went to ED at [facility] on 7/22/2020 for fever. COVID-19 test was completed but result indicates it was not detected. This is a follow up call.     Referral Source: ED Follow-Up  Clinical Data: Care Coordinator Outreach  Outreach attempted x 1.  Left message on patient's voicemail with call back information and requested return call.  Plan: Care Coordinator will try to reach patient again in 1-2 business days.    Lizzette Mcdaniel RN Care Coordinator  [facility] Clinics: [place], [place], [place], [place]  Phone: [phone]  E-Mail: [email or web address]

## 2020-07-24 NOTE — TELEPHONE ENCOUNTER
General Call:   Who is calling:  Pt's mom  Reason for Call:  Son needs f/u from [facility] ED visit. PT has worse RT ear drainage & pain since 7/22  What are your questions or concerns:  PT has temp of 101 and ear is causing a lot of discomfort  Date of last appointment with provider: 9/3/19  Okay to leave a detailed message:Yes at Cell number on file:    Telephone Information:   Mobile [phone]

## 2020-07-24 NOTE — PROGRESS NOTES
Clinic Care Coordination Contact    Clinic Care Coordination Contact  OUTREACH    Referral Information:  Referral Source: ED Follow-Up    Receive voicemail from mother Frank, requesting for a call back.  Phone call to Romanatha, mother. She indicates Mariann is still having discomfort and she will calling the clinic to schedule a follow up visit. He returned home from the emergency room without any medications and they also do not have all the test results. Informed her that COVID-19 test came back as undetectable. Inquired about the support he has in the home and she indicates Naheed is on the [employer] program with [facility] and she is his full-time caregiver, Sunday through Friday. His father takes over the cares on Saturday. Mother indicates they currently have the support they need at this time.     Primary Diagnosis: Other (include Comment box)(Fever)    Chief Complaint   Patient presents with     Clinic Care Coordination - Post Hospital     post ED follow up call/COVID-19 symptoms        Universal Utilization: Reviewed and no concern.      Utilization    Last refreshed: 7/24/2020  9:40 AM:  Hospital Admissions 0           Last refreshed: 7/24/2020  9:40 AM:  ED Visits 1           Last refreshed: 7/24/2020  9:40 AM:  No Show Count (past year) 0              Current as of: 7/24/2020  9:40 AM              Clinical Concerns:  Current Medical Concerns:    Patient Active Problem List   Diagnosis     Cerebral palsy, unspecified type (H)     Seizures (H)     Wheelchair bound         Education Provided to patient: Educated Romanatha about CC's role.      Health Maintenance Reviewed:        Medication Management:  Mother reports that Patient is currently not on any medication. If patient were to be on any medication, Mother and father provides support in managing this.      Functional Status:  Dependent ADLs:: Positioning, Transfers, Wheelchair-with assist, Toileting, Grooming, Eating, Dressing, Bathing,  Incontinence  Dependent IADLs:: Cleaning, Cooking, Laundry, Shopping, Meal Preparation, Medication Management, Money Management, Transportation, Incontinence  Bed or wheelchair confined:: Yes  Mobility Status: Dependent/Assisted by Another    Living Situation:  Current living arrangement:: I live in a private home with family  Type of residence:: Private home - no stairs    Lifestyle & Psychosocial Needs:  Lifestyle     Physical activity     Days per week: 0 days     Minutes per session: 0 min     Stress: Not at all     Social Needs     Financial resource strain: Somewhat hard     Food insecurity     Worry: Never true     Inability: Never true     Transportation needs     Medical: No     Non-medical: No     Inadequate nutrition (GOAL):: No  Inadequate activity/exercise (GOAL):: No  Transportation means:: Family     Informal Support system:: Family, Parent   Socioeconomic History     Marital status: Single     Spouse name: Not on file     Number of children: Not on file     Years of education: Not on file     Highest education level: Associate degree: occupational, technical, or vocational program   Relationships     Social connections     Talks on phone: Once a week     Gets together: Once a week     Attends Alevism service: More than 4 times per year     Active member of club or organization: No     Attends meetings of clubs or organizations: Never     Relationship status: Never      Intimate partner violence     Fear of current or ex partner: Not on file     Emotionally abused: Not on file     Physically abused: Not on file     Forced sexual activity: Not on file     Tobacco Use     Smoking status: Never Smoker     Smokeless tobacco: Never Used   Substance and Sexual Activity     Alcohol use: No     Frequency: Never     Drinks per session: Patient refused     Binge frequency: Never     Drug use: No     Sexual activity: Never          Resources and Interventions:  Current Resources:   List of home care  services:: (PCA services)  Community Resources: Home Care, County Worker     Equipment Currently Used at Home: wheelchair, manual    Advance Care Plan/Directive  Advanced Care Plans/Directives on file:: No    Referrals Placed: None     Patient/Caregiver understanding: Patient verbalized understanding and denies any additional questions or concerns at this time. RNCC engaged in [employer]ID[employer] communications during encounter.       Plan: Patient's mother will contact PCP clinic to schedule post-ed follow up care.   CC will send introduction/information about CC's role to Patient via [employer].     Lizzette Mcdaniel RN Care Coordinator  [facility] Clinics: [place], [place], [place], [place]  Phone: [phone]  E-Mail: [email or web address]

## 2020-07-24 NOTE — LETTER
[facility]ARE COORDINATION  [address] [place] [address]    July 24, 2020    Mariann Magana  [address]  [place] [address]      Dear Mariann,    I am a clinic care coordinator who works with JUSTICE Mi CNP at [facility]. I wanted to thank you for taking the time to speak with me this afternoon.  Below is a description of clinic care coordination and how I can further assist you.      The clinic care coordination team is made up of a registered nurse,  and community health worker who understand the health care system. The goal of clinic care coordination is to help you manage your health and improve access to the health care system in the most efficient manner. The team can assist you in meeting your health care goals by providing education, coordinating services, strengthening the communication among your providers and supporting you with any resource needs.    Please feel free to contact me at [phone] with any questions or concerns. We are focused on providing you with the highest-quality healthcare experience possible and that all starts with you.     Sincerely,     Lizzette Mcdaniel RN Care Coordinator  [facility]: [place], [place], [place], [place]  Phone: [phone]  E-Mail: [email or web address]

## 2020-07-29 NOTE — TELEPHONE ENCOUNTER
Attempted to call mom, no answer left VM.      Called dad:  Dad picked up patient on Friday, brought him to urgent care in [place], was prescribed antibiotic drops. Patient doing much better now on medications.    Nothing further needed right now.

## 2020-12-27 ENCOUNTER — HEALTH MAINTENANCE LETTER (OUTPATIENT)
Age: 25
End: 2020-12-27

## 2021-03-10 ENCOUNTER — IMMUNIZATION (OUTPATIENT)
Dept: NURSING | Facility: CLINIC | Age: 26
End: 2021-03-10
Payer: MEDICAID

## 2021-03-10 PROCEDURE — 0031A PR COVID VAC JANSSEN AD26 0.5ML: CPT

## 2021-03-10 PROCEDURE — 91303 PR COVID VAC JANSSEN AD26 0.5ML: CPT

## 2021-10-09 ENCOUNTER — HEALTH MAINTENANCE LETTER (OUTPATIENT)
Age: 26
End: 2021-10-09

## 2022-01-29 ENCOUNTER — HEALTH MAINTENANCE LETTER (OUTPATIENT)
Age: 27
End: 2022-01-29

## 2022-04-06 ENCOUNTER — TELEPHONE (OUTPATIENT)
Dept: PEDIATRICS | Facility: CLINIC | Age: 27
End: 2022-04-06

## 2022-04-06 DIAGNOSIS — G80.9 CEREBRAL PALSY, UNSPECIFIED TYPE (H): Primary | ICD-10-CM

## 2022-04-06 NOTE — TELEPHONE ENCOUNTER
Left message for patient to call back to help clarify which type of pull ups patient uses.    Ines Chavez RN

## 2022-04-06 NOTE — TELEPHONE ENCOUNTER
Pt needs DME order for pull-ups. Was told by [facility] that new Rx need to be sent over.  Geovanna VELEZ, ISRAEL,AAMA

## 2022-04-07 NOTE — TELEPHONE ENCOUNTER
"Received call from the pt's mother.    She was unsure why she was called. Reviewedd that a recent call was made yesterday in regard to DME for pull-ups.    No previous order in chart, but pt has been using for years.    Agreed to call [employer] to ensure accurate order is sent.    Called [facility].    They said that last  was in [facility] and on 03/30/22, a hold was placed as the pt is receiving homehealth care. And they are responsible for payment. Unsure what this is about and entails. Unsure who is their homehealth care team/care worker.    Current order signed and faxed (04/08/22).    ----------------------------------------------------------    Called pt's mother back.    She states that there has been no changes in pt's care, needs, coverage. Pt's  picked pull-ups 1-2 weeks ago and told they were was issue but did not fully provide details or knew what may be going on when communicating with pt's mother (parent share custody/responsibilities).      - Randell \"Froy\" Brian (he/him/his), RN - Patient Advocate Liason (PAL)  [facility]    "

## 2022-09-17 ENCOUNTER — HEALTH MAINTENANCE LETTER (OUTPATIENT)
Age: 27
End: 2022-09-17

## 2023-03-20 ENCOUNTER — TELEPHONE (OUTPATIENT)
Dept: PEDIATRICS | Facility: CLINIC | Age: 28
End: 2023-03-20
Payer: MEDICAID

## 2023-03-20 NOTE — TELEPHONE ENCOUNTER
Patient's father calling to get a handicap parking pass as he has him on the weekends and he is in a wheelchair.      Advised will have care team reach out to him to schedule an appointment for the form to be filled out.     He requested to be transferred to care team.    BEATRIZ Santos on 3/20/2023 at 10:00 AM

## 2023-03-27 ENCOUNTER — OFFICE VISIT (OUTPATIENT)
Dept: FAMILY MEDICINE | Facility: CLINIC | Age: 28
End: 2023-03-27
Payer: MEDICAID

## 2023-03-27 ENCOUNTER — NURSE TRIAGE (OUTPATIENT)
Dept: NURSING | Facility: CLINIC | Age: 28
End: 2023-03-27

## 2023-03-27 VITALS
SYSTOLIC BLOOD PRESSURE: 108 MMHG | HEART RATE: 90 BPM | BODY MASS INDEX: 28.73 KG/M2 | DIASTOLIC BLOOD PRESSURE: 71 MMHG | OXYGEN SATURATION: 98 % | RESPIRATION RATE: 24 BRPM | TEMPERATURE: 98.4 F | WEIGHT: 157.08 LBS

## 2023-03-27 DIAGNOSIS — Z02.9 ADMINISTRATIVE ENCOUNTER: ICD-10-CM

## 2023-03-27 DIAGNOSIS — R56.9 SEIZURES (H): Primary | ICD-10-CM

## 2023-03-27 DIAGNOSIS — Z13.1 SCREENING FOR DIABETES MELLITUS: ICD-10-CM

## 2023-03-27 DIAGNOSIS — Z00.00 ROUTINE GENERAL MEDICAL EXAMINATION AT A HEALTH CARE FACILITY: Primary | ICD-10-CM

## 2023-03-27 DIAGNOSIS — R56.9 SEIZURES (H): ICD-10-CM

## 2023-03-27 LAB — HBA1C MFR BLD: 5.2 % (ref 0–5.6)

## 2023-03-27 PROCEDURE — 99385 PREV VISIT NEW AGE 18-39: CPT | Performed by: FAMILY MEDICINE

## 2023-03-27 PROCEDURE — 36415 COLL VENOUS BLD VENIPUNCTURE: CPT | Performed by: FAMILY MEDICINE

## 2023-03-27 PROCEDURE — 83036 HEMOGLOBIN GLYCOSYLATED A1C: CPT | Performed by: FAMILY MEDICINE

## 2023-03-27 RX ORDER — DIAZEPAM 2.5 MG/.5ML
5 GEL RECTAL PRN
Qty: 1 SUPPOSITORY | Refills: 0 | Status: SHIPPED | OUTPATIENT
Start: 2023-03-27 | End: 2024-05-14

## 2023-03-27 ASSESSMENT — ENCOUNTER SYMPTOMS
PARESTHESIAS: 0
MYALGIAS: 0
DYSURIA: 0
ABDOMINAL PAIN: 0
CONSTIPATION: 0
PALPITATIONS: 0
SORE THROAT: 0
SHORTNESS OF BREATH: 0
HEMATOCHEZIA: 0
HEADACHES: 0
EYE PAIN: 0
WEAKNESS: 0
HEARTBURN: 0
JOINT SWELLING: 0
DIZZINESS: 0
CHILLS: 0
FEVER: 0
FREQUENCY: 0
ARTHRALGIAS: 0
DIARRHEA: 0
NAUSEA: 0
NERVOUS/ANXIOUS: 0
HEMATURIA: 0
COUGH: 0

## 2023-03-27 NOTE — TELEPHONE ENCOUNTER
Today Dr. VIVIEN Chung wrote RX for diazepam 5 mg SUPP.  [facility] Pharmacist from  [facility] calling to get RX resent,  as Diazepam comes in gel form. Writer will send message to provider and clinic.    Gregoria Hunter RN, [facility] Triage Nurse Advisor    Reason for Disposition    [1] Pharmacy calling with prescription question AND [2] triager unable to answer question    Protocols used: MEDICATION QUESTION CALL-A-

## 2023-03-27 NOTE — PROGRESS NOTES
SUBJECTIVE:   CC: Mariann is an 27 year old who presents for preventative health visit.   Additional Questions 3/27/2023   Roomed by Kyung Her   Accompanied by Mother     Forms 3/27/2023   Any forms needing to be completed Yes   Patient has been advised of split billing requirements and indicates understanding: Yes  Healthy Habits:     Getting at least 3 servings of Calcium per day:  Yes    Bi-annual eye exam:  NO    Dental care twice a year:  NO    Sleep apnea or symptoms of sleep apnea:  None    Diet:  Regular (no restrictions)    Frequency of exercise:  2-3 days/week    Taking medications regularly:  Yes    Medication side effects:  None    PHQ-2 Total Score: 0    Additional concerns today:  No    Other concerns:  Needs disability parking form completed.  No seizure for 10 years.  Mom during week, father on weekends.    Today's PHQ-2 Score:   PHQ-2 ( 1999 Pfizer) 3/27/2023   Q1: Little interest or pleasure in doing things 0   Q2: Feeling down, depressed or hopeless 0   PHQ-2 Score 0   PHQ-2 Total Score (12-17 Years)- Positive if 3 or more points; Administer PHQ-A if positive -   Q1: Little interest or pleasure in doing things Not at all   Q2: Feeling down, depressed or hopeless Not at all   PHQ-2 Score 0       Have you ever done Advance Care Planning? (For example, a Health Directive, POLST, or a discussion with a medical provider or your loved ones about your wishes): No, advance care planning information given to patient to review.  Patient declined advance care planning discussion at this time.    Social History     Tobacco Use     Smoking status: Never     Smokeless tobacco: Never   Substance Use Topics     Alcohol use: No         Alcohol Use 3/27/2023   Prescreen: >3 drinks/day or >7 drinks/week? Not Applicable   No flowsheet data found.    Last PSA: No results found for: PSA    Reviewed orders with patient. Reviewed health maintenance and updated orders accordingly - Yes      Reviewed and updated as  needed this visit by clinical staff   Tobacco  Allergies  Meds              Reviewed and updated as needed this visit by Provider                     Review of Systems   Constitutional: Negative for chills and fever.   HENT: Negative for congestion, ear pain, hearing loss and sore throat.    Eyes: Negative for pain and visual disturbance.   Respiratory: Negative for cough and shortness of breath.    Cardiovascular: Negative for chest pain, palpitations and peripheral edema.   Gastrointestinal: Negative for abdominal pain, constipation, diarrhea, heartburn, hematochezia and nausea.   Genitourinary: Negative for dysuria, frequency, genital sores, hematuria, impotence, penile discharge and urgency.   Musculoskeletal: Negative for arthralgias, joint swelling and myalgias.   Skin: Negative for rash.   Neurological: Negative for dizziness, weakness, headaches and paresthesias.   Psychiatric/Behavioral: Negative for mood changes. The patient is not nervous/anxious.          OBJECTIVE:   /71 (BP Location: Right arm, Patient Position: Sitting, Cuff Size: Adult Regular)   Pulse 90   Temp 98.4  F (36.9  C) (Temporal)   Resp 24   Wt 71.2 kg (157 lb 1.2 oz)   SpO2 98%   BMI 28.73 kg/m      Physical Exam  GENERAL: in wheelchair alert and no distress  EYES: Eyes grossly normal to inspection, PERRL and conjunctivae and sclerae normal  RESP: lungs clear to auscultation - no rales, rhonchi or wheezes  CV: regular rate and rhythm, normal S1 S2, no S3 or S4, no murmur, click or rub, no peripheral edema and peripheral pulses strong  ABDOMEN: soft, nontender, no hepatosplenomegaly, no masses and bowel sounds normal  PSYCH: appropriate for given medical condition        ASSESSMENT/PLAN:   Mariann was seen today for physical.    Diagnoses and all orders for this visit:    Routine general medical examination at a health care facility    Screening for diabetes mellitus  -     Hemoglobin A1c    Seizures (H)  -     diazepam 5  mg SUPP; Place 1 suppository (5 mg) rectally as needed for seizures    Administrative encounter  -Disability parking form completed      COUNSELING:   Reviewed preventive health counseling, as reflected in patient instructions       Healthy diet/nutrition        He reports that he has never smoked. He has never used smokeless tobacco.        Peterson Chung DO  [facility]

## 2023-03-29 ENCOUNTER — TELEPHONE (OUTPATIENT)
Dept: NURSING | Facility: CLINIC | Age: 28
End: 2023-03-29
Payer: MEDICAID

## 2023-03-29 RX ORDER — DIAZEPAM 2.5 MG/.5ML
2.5 GEL RECTAL
Qty: 1 EACH | Refills: 0 | Status: SHIPPED | OUTPATIENT
Start: 2023-03-29 | End: 2024-05-14

## 2023-03-29 NOTE — TELEPHONE ENCOUNTER
Raman, pharmacist from [facility], calling to ask for fax number send request to Dr. Chung.    Isi Colon RN  03/29/23 12:29 PM  [facility] Nurse Advisor

## 2023-03-29 NOTE — TELEPHONE ENCOUNTER
Noted.    No further action needed from triage.    YUAN BurciagaN, RN-BC  [facility]

## 2023-05-16 ENCOUNTER — TELEPHONE (OUTPATIENT)
Dept: FAMILY MEDICINE | Facility: CLINIC | Age: 28
End: 2023-05-16
Payer: MEDICAID

## 2023-05-16 DIAGNOSIS — R56.9 SEIZURES (H): Primary | ICD-10-CM

## 2023-05-16 RX ORDER — DIAZEPAM 10 MG/2G
5 GEL RECTAL
Qty: 1 EACH | Refills: 0 | Status: SHIPPED | OUTPATIENT
Start: 2023-05-16 | End: 2024-05-14

## 2023-05-16 NOTE — TELEPHONE ENCOUNTER
This Rx for diazepam (DIASTAT) 2.5 MG GEL rectal gel cannot be ordered.  On long term back order.  Please put in a new Rx for an alternative.

## 2024-01-15 ENCOUNTER — IMMUNIZATION (OUTPATIENT)
Dept: FAMILY MEDICINE | Facility: CLINIC | Age: 29
End: 2024-01-15
Payer: MEDICAID

## 2024-01-15 DIAGNOSIS — Z23 HIGH PRIORITY FOR 2019-NCOV VACCINE: Primary | ICD-10-CM

## 2024-01-15 PROCEDURE — 90480 ADMN SARSCOV2 VAC 1/ONLY CMP: CPT

## 2024-01-15 PROCEDURE — 91320 SARSCV2 VAC 30MCG TRS-SUC IM: CPT

## 2024-01-31 ENCOUNTER — TELEPHONE (OUTPATIENT)
Dept: PEDIATRICS | Facility: CLINIC | Age: 29
End: 2024-01-31
Payer: MEDICAID

## 2024-01-31 NOTE — TELEPHONE ENCOUNTER
Faxed back to [facility] with note that patient has not been seen in the [facility] since 2019. Needs appointment OR forward to current PCP elsewhere.    Thank you  Toby HAMILTON

## 2024-01-31 NOTE — TELEPHONE ENCOUNTER
Received forms from [employer] for pull -ups.  Pt hasn't been seen at our clinic since 2019.  Please forward these forms to provide who did last physical.  Denae Stokes APRN, CNP

## 2024-02-26 ENCOUNTER — PATIENT OUTREACH (OUTPATIENT)
Dept: CARE COORDINATION | Facility: CLINIC | Age: 29
End: 2024-02-26
Payer: MEDICAID

## 2024-03-11 ENCOUNTER — PATIENT OUTREACH (OUTPATIENT)
Dept: CARE COORDINATION | Facility: CLINIC | Age: 29
End: 2024-03-11
Payer: MEDICAID

## 2024-03-28 ENCOUNTER — TELEPHONE (OUTPATIENT)
Dept: PEDIATRICS | Facility: CLINIC | Age: 29
End: 2024-03-28
Payer: MEDICAID

## 2024-03-28 NOTE — TELEPHONE ENCOUNTER
Medication Question or Refill    Contacts         Type Contact Phone/Fax    03/28/2024 02:59 PM CDT Phone (Incoming) CHARITY DOOLEY (Mother) [phone]            What medication are you calling about (include dose and sig)?: Overnight pull ups    Preferred Pharmacy:       [facility]  [address] [place] [address]  Phone: [phone] Fax: [phone]      Controlled Substance Agreement on file:   CSA -- Patient Level:    CSA: None found at the patient level.       Who prescribed the medication?: Unsure    Do you need a refill? Yes    When did you use the medication last? 03/28/24    Patient offered an appointment? No    Do you have any questions or concerns?  No      Could we send this information to you in [facility] or would you prefer to receive a phone call?:   Patient would prefer a phone call   Okay to leave a detailed message?: Yes at Home number on file [phone] (home)

## 2024-03-29 ENCOUNTER — TELEPHONE (OUTPATIENT)
Dept: PEDIATRICS | Facility: CLINIC | Age: 29
End: 2024-03-29
Payer: MEDICAID

## 2024-03-29 DIAGNOSIS — G80.9 CEREBRAL PALSY, UNSPECIFIED TYPE (H): Primary | ICD-10-CM

## 2024-03-29 NOTE — TELEPHONE ENCOUNTER
Call placed to pt's Mom with message from provider    Mom verbalized understanding and agrees to the plan    Kush Ham RN on 3/29/2024 at 11:49 AM

## 2024-03-29 NOTE — TELEPHONE ENCOUNTER
Mom calls, discussed below, recommend contact Dr Gill's office as saw them about one year ago, mom agrees, [facility] still has pull ups under Dr Worthy's name and now order , provided number to call [place] location  Nirali Villanueva RN, BSN  [facility]

## 2024-03-29 NOTE — TELEPHONE ENCOUNTER
Order/Referral Request    Who is requesting: Pt mom     Orders being requested: Pull Ups     Reason service is needed/diagnosis: Pt has been using pull ups for awhile     When are orders needed by: ASAP as pt is out of pull ups currently     Has this been discussed with Provider: Yes    Does patient have a preference on a Group/Provider/Facility? [facility]     Does patient have an appointment scheduled?: Yes: Pt is scheduled for 05/14/24    Where to send orders: Fax to [facility] [phone]     Could we send this information to you in [employer] or would you prefer to receive a phone call?:   Patient would prefer a phone call   Okay to leave a detailed message?: Yes at Home number on file [phone] (home)

## 2024-03-29 NOTE — TELEPHONE ENCOUNTER
Gregoria,    Look like you last saw this pt in 2019  Last DME order was 4/6/2022    Order pended- please review    Thank you  Kush Ham RN on 3/29/2024 at 11:19 AM

## 2024-05-04 ENCOUNTER — HEALTH MAINTENANCE LETTER (OUTPATIENT)
Age: 29
End: 2024-05-04

## 2024-05-14 ENCOUNTER — OFFICE VISIT (OUTPATIENT)
Dept: PEDIATRICS | Facility: CLINIC | Age: 29
End: 2024-05-14
Payer: MEDICAID

## 2024-05-14 VITALS
DIASTOLIC BLOOD PRESSURE: 75 MMHG | RESPIRATION RATE: 18 BRPM | HEIGHT: 62 IN | HEART RATE: 89 BPM | TEMPERATURE: 98.1 F | BODY MASS INDEX: 21.14 KG/M2 | WEIGHT: 114.9 LBS | OXYGEN SATURATION: 95 % | SYSTOLIC BLOOD PRESSURE: 109 MMHG

## 2024-05-14 DIAGNOSIS — R32 URINARY INCONTINENCE, UNSPECIFIED TYPE: ICD-10-CM

## 2024-05-14 DIAGNOSIS — R56.9 SEIZURES (H): ICD-10-CM

## 2024-05-14 DIAGNOSIS — Z00.00 ROUTINE GENERAL MEDICAL EXAMINATION AT A HEALTH CARE FACILITY: Primary | ICD-10-CM

## 2024-05-14 DIAGNOSIS — G80.9 CEREBRAL PALSY, UNSPECIFIED TYPE (H): ICD-10-CM

## 2024-05-14 DIAGNOSIS — R15.9 INCONTINENCE OF FECES, UNSPECIFIED FECAL INCONTINENCE TYPE: ICD-10-CM

## 2024-05-14 DIAGNOSIS — Z86.69 HISTORY OF ANOXIC BRAIN INJURY: ICD-10-CM

## 2024-05-14 DIAGNOSIS — F45.8 BRUXISM: ICD-10-CM

## 2024-05-14 DIAGNOSIS — F79 INTELLECTUAL DISABILITY: ICD-10-CM

## 2024-05-14 DIAGNOSIS — R21 RASH: ICD-10-CM

## 2024-05-14 PROCEDURE — 99214 OFFICE O/P EST MOD 30 MIN: CPT | Mod: 25 | Performed by: INTERNAL MEDICINE

## 2024-05-14 PROCEDURE — 99395 PREV VISIT EST AGE 18-39: CPT | Performed by: INTERNAL MEDICINE

## 2024-05-14 RX ORDER — DIAZEPAM 10 MG/2G
5 GEL RECTAL
Qty: 1 EACH | Refills: 1 | Status: SHIPPED | OUTPATIENT
Start: 2024-05-14

## 2024-05-14 SDOH — HEALTH STABILITY: PHYSICAL HEALTH: ON AVERAGE, HOW MANY DAYS PER WEEK DO YOU ENGAGE IN MODERATE TO STRENUOUS EXERCISE (LIKE A BRISK WALK)?: 0 DAYS

## 2024-05-14 ASSESSMENT — SOCIAL DETERMINANTS OF HEALTH (SDOH): HOW OFTEN DO YOU GET TOGETHER WITH FRIENDS OR RELATIVES?: THREE TIMES A WEEK

## 2024-05-14 ASSESSMENT — PAIN SCALES - GENERAL: PAINLEVEL: NO PAIN (0)

## 2024-05-14 NOTE — PATIENT INSTRUCTIONS
"Preventive Care Advice   This is general advice we often give to help people stay healthy. Your care team may have specific advice just for you. Please talk to your care team about your own preventive care needs.  Lifestyle  Exercise at least 150 minutes each week (30 minutes a day, 5 days a week).  Do muscle strengthening activities 2 days a week. These help control your weight and prevent disease.  No smoking.  Wear sunscreen to prevent skin cancer.  Have your home tested for radon every 2 to 5 years. Radon is a colorless, odorless gas that can harm your lungs. To learn more, go to www.health.[facility].mn. and search for \"Radon in Homes.\"  Keep guns unloaded and locked up in a safe place like a safe or gun vault, or, use a gun lock and hide the keys. Always lock away bullets separately. To learn more, visit [employer].mn.gov and search for \"safe gun storage.\"  Nutrition  Eat 5 or more servings of fruits and vegetables each day.  Try wheat bread, brown rice and whole grain pasta (instead of white bread, rice, and pasta).  Get enough calcium and vitamin D. Check the label on foods and aim for 100% of the RDA (recommended daily allowance).  Regular exams  Have a dental exam and cleaning every 6 months.  See your health care team every year to talk about:  Any changes in your health.  Any medicines your care team has prescribed.  Preventive care, family planning, and ways to prevent chronic diseases.  Shots (vaccines)   HPV shots (up to age 26), if you've never had them before.  Hepatitis B shots (up to age 59), if you've never had them before.  COVID-19 shot: Get this shot when it's due.  Flu shot: Get a flu shot every year.  Tetanus shot: Get a tetanus shot every 10 years.  Pneumococcal, hepatitis A, and RSV shots: Ask your care team if you need these based on your risk.  Shingles shot (for age 50 and up).  General health tests  Diabetes screening:  Starting at age 35, Get screened for diabetes at least every 3 years.  If " you are younger than age 35, ask your care team if you should be screened for diabetes.  Cholesterol test: At age 39, start having a cholesterol test every 5 years, or more often if advised.  Bone density scan (DEXA): At age 50, ask your care team if you should have this scan for osteoporosis (brittle bones).  Hepatitis C: Get tested at least once in your life.  Abdominal aortic aneurysm screening: Talk to your doctor about having this screening if you:  Have ever smoked; and  Are biologically male; and  Are between the ages of 65 and 75.  STIs (sexually transmitted infections)  Before age 24: Ask your care team if you should be screened for STIs.  After age 24: Get screened for STIs if you're at risk. You are at risk for STIs (including HIV) if:  You are sexually active with more than one person.  You don't use condoms every time.  You or a partner was diagnosed with a sexually transmitted infection.  If you are at risk for HIV, ask about PrEP medicine to prevent HIV.  Get tested for HIV at least once in your life, whether you are at risk for HIV or not.  Cancer screening tests  Cervical cancer screening: If you have a cervix, begin getting regular cervical cancer screening tests at age 21. Most people who have regular screenings with normal results can stop after age 65. Talk about this with your provider.  Breast cancer scan (mammogram): If you've ever had breasts, begin having regular mammograms starting at age 40. This is a scan to check for breast cancer.  Colon cancer screening: It is important to start screening for colon cancer at age 45.  Have a colonoscopy test every 10 years (or more often if you're at risk) Or, ask your provider about stool tests like a FIT test every year or Cologuard test every 3 years.  To learn more about your testing options, visit: www.[employer]/798994.pdf.  For help making a decision, visit: jimenez/ez79280.  Prostate cancer screening test: If you have a prostate and are age 55  to 69, ask your provider if you would benefit from a yearly prostate cancer screening test.  Lung cancer screening: If you are a current or former smoker age 50 to 80, ask your care team if ongoing lung cancer screenings are right for you.  For informational purposes only. Not to replace the advice of your health care provider. Copyright   2023 [facility]. All rights reserved. Clinically reviewed by the [facility] Transitions Program. [employer] [id number] - REV 04/24.    Eating Healthy Foods: Care Instructions  With every meal, you can make healthy food choices. Try to eat a variety of fruits, vegetables, whole grains, lean proteins, and low-fat dairy products. This can help you get the right balance of nutrients, including vitamins and minerals. Small changes add up over time. You can start by adding one healthy food to your meals each day.    Try to make half your plate fruits and vegetables, one-fourth whole grains, and one-fourth lean proteins. Try including dairy with your meals.   Eat more fruits and vegetables. Try to have them with most meals and snacks.   Foods for healthy eating    Fruits    These can be fresh, frozen, canned, or dried.  Try to choose whole fruit rather than fruit juice.  Eat a variety of colors.    Vegetables    These can be fresh, frozen, canned, or dried.  Beans, peas, and lentils count too.    Whole grains    Choose whole-grain breads, cereals, and noodles.  Try brown rice.    Lean proteins    These can include lean meat, poultry, fish, and eggs.  You can also have tofu, beans, peas, lentils, nuts, and seeds.    Dairy    Try milk, yogurt, and cheese.  Choose low-fat or fat-free when you can.  If you need to, use lactose-free milk or fortified plant-based milk products, such as soy milk.    Water    Drink water when you're thirsty.  Limit sugar-sweetened drinks, including soda, fruit drinks, and sports drinks.  Where can you learn more?  Go to  "https://www.healthGenlot.net/patiented  Enter T756 in the search box to learn more about \"Eating Healthy Foods: Care Instructions.\"  Current as of: September 20, 2023               Content Version: 14.0    0843-4874 Healthwise, [employer].   Care instructions adapted under license by your healthcare professional. If you have questions about a medical condition or this instruction, always ask your healthcare professional. Healthwise, [employer] disclaims any warranty or liability for your use of this information.      "

## 2024-05-14 NOTE — PROGRESS NOTES
Preventive Care Visit  [facility] UMESH Lindo MD, Internal Medicine - Pediatrics  May 14, 2024      Assessment & Plan     (Z00.00) Routine general medical examination at a health care facility  (primary encounter diagnosis)    (G80.9) Cerebral palsy, unspecified type (H)  (Z86.69) History of anoxic brain injury  (F79) Intellectual disability  Comment:   Plan: History of anoxic brain injury and cerebral palsy since birth with severe intellectual disability.  Lives at home with his mother and spends some time with biologic father/mother and father live separately.    (R56.9) Seizures (H)  Comment:      Plan: diazepam (DIASTAT ACUDIAL) 10 MG GEL rectal gel        History of seizure disorder, previously on Trileptal.  Trileptal discontinued by neurology several years ago, without recurrent grand mal seizures.  Mother does have diazepam on hand as needed-refill.    (R15.9) Incontinence of feces, unspecified fecal incontinence type  (R32) Urinary incontinence, unspecified type  Plan: Incontinence Supplies Order for DME - ONLY FOR         DME        Fecal and urinary incontinence.  New prescription for pull-ups    (F45.8) Bruxism  Comment: Chronic bruxism and associated dental issues, parents have not taken him to a dentist for at least 4 years.  Mother needs assistance reestablishing with dental/does not have dental insurance.  Forwarded to care coordination for assistance  Plan: Primary Care - Care Coordination Referral          (R21) Rash  Comment: Longstanding diffuse hyperpigmented macular rash, asymptomatic.  Previously treated with topical steroids and ketoconazole without improvement.  Referral to dermatology.  Plan: Adult Dermatology  Referral              Counseling  Appropriate preventive services were discussed with this patient, including applicable screening as appropriate for fall prevention, nutrition, physical activity, Tobacco-use cessation, weight loss and cognition.   Checklist reviewing preventive services available has been given to the patient.  Reviewed patient's diet, addressing concerns and/or questions.   The patient was instructed to see the dentist every 6 months.           Verito Waite is a 28 year old, presenting for the following:  Physical        5/14/2024    12:50 PM   Additional Questions   Roomed by Elizabeth JOHNSTON   Accompanied by N/A         5/14/2024   Forms   Any forms needing to be completed Yes         5/14/2024    12:50 PM   Patient Reported Additional Medications   Patient reports taking the following new medications None        HPI          5/14/2024   General Health   How would you rate your overall physical health? Good   Feel stress (tense, anxious, or unable to sleep) Not at all         5/14/2024   Nutrition   Three or more servings of calcium each day? Yes   Diet: Regular (no restrictions)   How many servings of fruit and vegetables per day? (!) 2-3   How many sweetened beverages each day? 0-1         5/14/2024   Exercise   Days per week of moderate/strenous exercise 0 days   (!) EXERCISE CONCERN      5/14/2024   Social Factors   Frequency of gathering with friends or relatives Three times a week   Worry food won't last until get money to buy more No   Food not last or not have enough money for food? No   Do you have housing?  Yes   Are you worried about losing your housing? No   Lack of transportation? No   Unable to get utilities (heat,electricity)? No         5/14/2024   Dental   Dentist two times every year? (!) NO         5/14/2024   TB Screening   Were you born outside of the [place]? No         Today's PHQ-2 Score:       5/14/2024    12:46 PM   PHQ-2 ( 1999 [employer])   Q1: Little interest or pleasure in doing things 1   Q2: Feeling down, depressed or hopeless 0   PHQ-2 Score 1   Q1: Little interest or pleasure in doing things Several days   Q2: Feeling down, depressed or hopeless Not at all   PHQ-2 Score 1           5/14/2024   Substance Use  "  Alcohol more than 3/day or more than 7/wk Not Applicable   Do you use any other substances recreationally? No     Social History     Tobacco Use    Smoking status: Never     Passive exposure: Never    Smokeless tobacco: Never   Vaping Use    Vaping status: Never Used   Substance Use Topics    Alcohol use: No    Drug use: No             5/14/2024   One time HIV Screening   Previous HIV test? No         5/14/2024   STI Screening   New sexual partner(s) since last STI/HIV test? No         5/14/2024   Contraception/Family Planning   Questions about contraception or family planning No        Reviewed and updated as needed this visit by Provider                    Patient Active Problem List   Diagnosis    Cerebral palsy, unspecified type (H)    Seizures (H)    Wheelchair bound    Intellectual disability    History of anoxic brain injury    Bruxism    Incontinence of feces, unspecified fecal incontinence type    Urinary incontinence, unspecified type     Past Surgical History:   Procedure Laterality Date    NO HISTORY OF SURGERY         Social History     Tobacco Use    Smoking status: Never     Passive exposure: Never    Smokeless tobacco: Never   Substance Use Topics    Alcohol use: No     Family History   Problem Relation Age of Onset    Seizure Disorder Paternal Grandmother     Prostate Cancer No family hx of          Current Outpatient Medications   Medication Sig Dispense Refill    diazepam (DIASTAT ACUDIAL) 10 MG GEL rectal gel Place 5 mg rectally once as needed for seizures 1 each 1         Review of Systems  Constitutional, neuro, ENT, endocrine, pulmonary, cardiac, gastrointestinal, genitourinary, musculoskeletal, integument and psychiatric systems are negative, except as otherwise noted.     Objective    Exam  /75 (BP Location: Right arm, Patient Position: Sitting, Cuff Size: Adult Regular)   Pulse 89   Temp 98.1  F (36.7  C) (Tympanic)   Resp 18   Ht 1.575 m (5' 2\")   Wt 52.1 kg (114 lb 14.4 oz)  " " SpO2 95%   BMI 21.02 kg/m     Estimated body mass index is 21.02 kg/m  as calculated from the following:    Height as of this encounter: 1.575 m (5' 2\").    Weight as of this encounter: 52.1 kg (114 lb 14.4 oz).    Physical Exam  GENERAL: alert and no distress  EYES: Eyes grossly normal to inspection, PERRL and conjunctivae and sclerae normal  NECK: no adenopathy, no asymmetry, masses, or scars  RESP: lungs clear to auscultation - no rales, rhonchi or wheezes  CV: regular rate and rhythm, normal S1 S2, no S3 or S4, no murmur  ABDOMEN: soft, nontender, no hepatosplenomegaly  SKIN: Diffuse hyperpigmented macules of varying sizes generally 1-2 cm over the chest arms and legs.  Similar hyperpigmentation and larger more confluent patches on the back  NEURO:   Upper and lower extremity contractures and hypertonicity consistent with cerebral palsy.  Exam at baseline.  PSYCH: At baseline-nonverbal, severe intellectual disability        Signed Electronically by: Bonifacio Lindo MD    "

## 2024-05-14 NOTE — LETTER
May 14, 2024    TANNER  Mariann Magana  [address] [address]        To Whom It May Concern,     Mariann is currently under our care.  Mariann has a history of cerebral palsy and intellectual disability.  He requires photo identification.            Sincerely,        Bonifacio Lindo MD

## 2024-05-15 ENCOUNTER — PATIENT OUTREACH (OUTPATIENT)
Dept: CARE COORDINATION | Facility: CLINIC | Age: 29
End: 2024-05-15
Payer: MEDICAID

## 2024-05-15 NOTE — LETTER
[facility] CARE COORDINATION  [address] [place] [address]    May 16, 2024    Mariann Magana  [address] [facility] [place] [address]      Dear Mariann,    I am a clinic community health worker who works with JUSTICE HENRY CNP with the [facility]. I have been trying to reach you recently to introduce Clinic Care Coordination. Below is a description of clinic care coordination and how I can further assist you.       The clinic care coordination team is made up of a registered nurse, , financial resource worker and community health worker who understand the health care system. The goal of clinic care coordination is to help you manage your health and improve access to the health care system. Our team works alongside your provider to assist you in determining your health and social needs. We can help you obtain health care and community resources, providing you with necessary information and education. We can work with you through any barriers and develop a care plan that helps coordinate and strengthen the communication between you and your care team.  Our services are voluntary and are offered without charge to you personally.    Please feel free to contact me with any questions or concerns regarding care coordination and what we can offer.      We are focused on providing you with the highest-quality healthcare experience possible.    Sincerely,     Therese Campos, LUIS, B.S. [facility]  Clinic Care Coordination  [facility]:  [place], [place] and [place]  [phone]  [email or web address]

## 2024-05-15 NOTE — PROGRESS NOTES
Clinic Care Coordination Contact  [facility]/Voicemail    Clinical Data: Care Coordinator Outreach    Outreach Documentation Number of Outreach Attempt   5/15/2024  10:03 AM 1       Left message on patient's moms voicemail with call back information and requested return call.    Plan: Care Coordinator will try to reach patient again in 1-2 business days.    LUIS Zapien, B.S. [facility] Care Coordination  [facility] Clinics:  [place], [place] and [place]  [phone]  [email or web address]      Mnits: This subscriber has eligibility for MA: Medical Assistance.  Elig Type DX: Disabled  Eligibility Begin Date: 11/01/2018  Eligibility End Date: --/--/----  This subscriber is eligible for the following service types: Medical Care ,  Chiropractic ,  Dental Care ,  Hospital ,  Hospital - Inpatient ,  Hospital - Outpatient ,  Emergency Services ,  Pharmacy ,  Professional (Physician) Visit - Office ,  Vision (Optometry) ,  Mental Health ,  Urgent Care    Patient also has DD waiver

## 2024-05-16 NOTE — PROGRESS NOTES
Clinic Care Coordination Contact  [facility]/Voicemail    Clinical Data: Care Coordinator Outreach    Outreach Documentation Number of Outreach Attempt   5/15/2024  10:03 AM 1   5/16/2024   3:37 PM 2       Left message on patient's voicemail with call back information and requested return call.    Plan: Care Coordinator will send care coordination introduction letter with care coordinator contact information and explanation of care coordination services via [employer]. Care Coordinator will do no further outreaches at this time.    Therese Campos, LUIS, B.S. [facility] Care Coordination  [facility] Clinics:  [place], [place] and [place]  [phone]  [email or web address]

## 2024-11-12 ENCOUNTER — TELEPHONE (OUTPATIENT)
Dept: PEDIATRICS | Facility: CLINIC | Age: 29
End: 2024-11-12
Payer: MEDICAID

## 2024-11-12 NOTE — TELEPHONE ENCOUNTER
Forms/Letter Request    Type of form/letter: Disability       Do we have the form/letter: No    Who is the form from?  (if other please explain)    Where did/will the form come from?     When is form/letter needed by: as soon as possible to get an ID for traveling purposes    How would you like the form/letter returned:  Call neena De La Rosa  to discuss    Patient Notified form requests are processed in 5-7 business days:Yes    Could we send this information to you in [employer] or would you prefer to receive a phone call?:   Patient would prefer a phone call   Okay to leave a detailed message?: Yes at Other phone number:  [phone]

## 2024-11-14 NOTE — TELEPHONE ENCOUNTER
"RN attempted to contact patient with number listed ([phone]) patient's mother (\"Bernadette\") answered. No CTC on file. No patient information disclosed. Patient's mother states she has guardianship, no information listed in chart.   RN sent email to [employer] to request guardianship paperwork be updated in patient's chart.    Will postpone until tomorrow and check if guardianship paperwork updated.  Once updated, will contact father to get more info on what is needed.    Susanna Deal RN   "

## 2024-11-14 NOTE — TELEPHONE ENCOUNTER
Routing to MA/TC; please assist with form/letter. If needing more information please call patient.   May need visit.     Thanks!  Cher Cruz RN

## 2024-11-15 NOTE — TELEPHONE ENCOUNTER
Patient father returns call- he has a form from the Social Security office that needs to be completed by a healthcare provider.  Appointment is made for 11/19/24 with Denae Stokes for in person completion.  They will have form with them.    - Advised father that if he has physical proof of guardianship over Mariann he should bring it so we can get that corrected in Mariann's chart.  Father didn't think he had any paperwork regarding this so we advised him to obtain this ASAP to ensure the best care possible for [facility].      Carrie FORBES, - [facility]  Primary Care- [place] [facility]

## 2024-11-19 ENCOUNTER — TELEPHONE (OUTPATIENT)
Dept: PEDIATRICS | Facility: CLINIC | Age: 29
End: 2024-11-19

## 2024-11-19 ENCOUNTER — OFFICE VISIT (OUTPATIENT)
Dept: PEDIATRICS | Facility: CLINIC | Age: 29
End: 2024-11-19
Payer: MEDICAID

## 2024-11-19 VITALS
RESPIRATION RATE: 18 BRPM | OXYGEN SATURATION: 100 % | HEIGHT: 62 IN | BODY MASS INDEX: 20.24 KG/M2 | WEIGHT: 110 LBS | TEMPERATURE: 98 F | HEART RATE: 89 BPM

## 2024-11-19 DIAGNOSIS — G80.9 CEREBRAL PALSY, UNSPECIFIED TYPE (H): Primary | ICD-10-CM

## 2024-11-19 PROCEDURE — 99213 OFFICE O/P EST LOW 20 MIN: CPT | Performed by: PHYSICIAN ASSISTANT

## 2024-11-19 NOTE — PROGRESS NOTES
"  Assessment & Plan     Cerebral palsy, unspecified type (H)  Discussed with dad that I am unable to provide information as he is not listed as legal guardian.   Pt is mostly nonverbal. Lives with mother. Mom was contacted to discuss paperwork for legal guardianship- she reports she has paper work at home. Mom and dad do not live together.  Mom also will need to bring in paperwork to show she has legal guardianship of pt prior to any other medical treatments or requests.                    Verito Waite is a 29 year old, presenting for the following health issues:  No chief complaint on file.    HPI    Here with father. Needs copy of medical records and Social security form completed to get social security card  Pt with.  Pt mostly nonverbal                Objective    Pulse 89   Temp 98  F (36.7  C) (Temporal)   Resp 18   Ht 1.575 m (5' 2\")   Wt 49.9 kg (110 lb)   SpO2 100%   BMI 20.12 kg/m    Body mass index is 20.12 kg/m .  Physical Exam   GENERAL: alert and no distress            Signed Electronically by: Annie Chaidez PA-C    "

## 2024-11-19 NOTE — TELEPHONE ENCOUNTER
Needs to be rescheduled with PCP-virtual would be ok  PCP partners do not complete these forms  Denae Stokes, APRN, CNP

## 2024-12-08 ENCOUNTER — DOCUMENTATION ONLY (OUTPATIENT)
Dept: OTHER | Facility: CLINIC | Age: 29
End: 2024-12-08
Payer: MEDICAID

## 2025-03-20 ENCOUNTER — TELEPHONE (OUTPATIENT)
Dept: PEDIATRICS | Facility: CLINIC | Age: 30
End: 2025-03-20
Payer: MEDICAID

## 2025-03-20 NOTE — TELEPHONE ENCOUNTER
Forms/Letter Request    Type of form/letter: OTHER: Adult pull-on/incontinence    Do we have the form/letter: Yes: Gregoria's inbox    Who is the form from? [facility] (if other please explain)    Where did/will the form come from? form was faxed in    When is form/letter needed by: next available    How would you like the form/letter returned: Fax : [phone]    Patient Notified form requests are processed in 5-7 business days:N/A    Could we send this information to you in [employer] or would you prefer to receive a phone call?:   NA

## 2025-06-28 ENCOUNTER — HEALTH MAINTENANCE LETTER (OUTPATIENT)
Age: 30
End: 2025-06-28